# Patient Record
Sex: MALE | Race: WHITE | NOT HISPANIC OR LATINO | Employment: OTHER | URBAN - METROPOLITAN AREA
[De-identification: names, ages, dates, MRNs, and addresses within clinical notes are randomized per-mention and may not be internally consistent; named-entity substitution may affect disease eponyms.]

---

## 2020-01-01 ENCOUNTER — APPOINTMENT (INPATIENT)
Dept: RADIOLOGY | Facility: HOSPITAL | Age: 70
DRG: 137 | End: 2020-01-01
Payer: SUBSIDIZED

## 2020-01-01 ENCOUNTER — HOSPITAL ENCOUNTER (INPATIENT)
Facility: HOSPITAL | Age: 70
LOS: 2 days | DRG: 137 | End: 2020-04-01
Attending: EMERGENCY MEDICINE | Admitting: ANESTHESIOLOGY
Payer: SUBSIDIZED

## 2020-01-01 ENCOUNTER — APPOINTMENT (EMERGENCY)
Dept: RADIOLOGY | Facility: HOSPITAL | Age: 70
DRG: 137 | End: 2020-01-01
Payer: SUBSIDIZED

## 2020-01-01 VITALS
BODY MASS INDEX: 33 KG/M2 | RESPIRATION RATE: 30 BRPM | OXYGEN SATURATION: 91 % | DIASTOLIC BLOOD PRESSURE: 66 MMHG | SYSTOLIC BLOOD PRESSURE: 91 MMHG | HEIGHT: 70 IN | WEIGHT: 230.51 LBS | HEART RATE: 140 BPM | TEMPERATURE: 98.2 F

## 2020-01-01 DIAGNOSIS — R09.02 HYPOXIA: ICD-10-CM

## 2020-01-01 DIAGNOSIS — N17.9 AKI (ACUTE KIDNEY INJURY) (HCC): ICD-10-CM

## 2020-01-01 DIAGNOSIS — J96.00 ACUTE RESPIRATORY FAILURE (HCC): ICD-10-CM

## 2020-01-01 DIAGNOSIS — J18.9 PNEUMONIA: Primary | ICD-10-CM

## 2020-01-01 DIAGNOSIS — N19 RENAL FAILURE: ICD-10-CM

## 2020-01-01 DIAGNOSIS — E87.5 HYPERKALEMIA: ICD-10-CM

## 2020-01-01 LAB
ALBUMIN SERPL BCP-MCNC: 3.2 G/DL (ref 3.5–5)
ALP SERPL-CCNC: 59 U/L (ref 46–116)
ALT SERPL W P-5'-P-CCNC: 38 U/L (ref 12–78)
ANION GAP SERPL CALCULATED.3IONS-SCNC: 11 MMOL/L (ref 4–13)
ANION GAP SERPL CALCULATED.3IONS-SCNC: 12 MMOL/L (ref 4–13)
ANION GAP SERPL CALCULATED.3IONS-SCNC: 12 MMOL/L (ref 4–13)
ANION GAP SERPL CALCULATED.3IONS-SCNC: 20 MMOL/L (ref 4–13)
ANION GAP SERPL CALCULATED.3IONS-SCNC: 9 MMOL/L (ref 4–13)
ANION GAP SERPL CALCULATED.3IONS-SCNC: 9 MMOL/L (ref 4–13)
APTT PPP: 31 SECONDS (ref 23–37)
APTT PPP: 32 SECONDS (ref 23–37)
APTT PPP: 33 SECONDS (ref 23–37)
APTT PPP: 40 SECONDS (ref 23–37)
APTT PPP: 41 SECONDS (ref 23–37)
APTT PPP: 46 SECONDS (ref 23–37)
ARTERIAL PATENCY WRIST A: NO
ARTERIAL PATENCY WRIST A: NO
ARTERIAL PATENCY WRIST A: YES
AST SERPL W P-5'-P-CCNC: 80 U/L (ref 5–45)
ATRIAL RATE: 122 BPM
ATRIAL RATE: 122 BPM
ATRIAL RATE: 127 BPM
ATRIAL RATE: 136 BPM
ATRIAL RATE: 38 BPM
ATRIAL RATE: 95 BPM
BACTERIA UR QL AUTO: ABNORMAL /HPF
BASE EXCESS BLDA CALC-SCNC: -1.8 MMOL/L
BASE EXCESS BLDA CALC-SCNC: -12.2 MMOL/L
BASE EXCESS BLDA CALC-SCNC: -3.3 MMOL/L
BASE EXCESS BLDA CALC-SCNC: -3.8 MMOL/L
BASE EXCESS BLDA CALC-SCNC: -3.9 MMOL/L
BASE EXCESS BLDA CALC-SCNC: -4 MMOL/L
BASE EXCESS BLDA CALC-SCNC: -5.3 MMOL/L
BASE EXCESS BLDA CALC-SCNC: 1.5 MMOL/L
BASOPHILS # BLD AUTO: 0.05 THOUSANDS/ΜL (ref 0–0.1)
BASOPHILS # BLD MANUAL: 0 THOUSAND/UL (ref 0–0.1)
BASOPHILS NFR BLD AUTO: 0 % (ref 0–1)
BASOPHILS NFR MAR MANUAL: 0 % (ref 0–1)
BILIRUB SERPL-MCNC: 0.4 MG/DL (ref 0.2–1)
BILIRUB UR QL STRIP: NEGATIVE
BODY TEMPERATURE: 100.4 DEGREES FEHRENHEIT
BODY TEMPERATURE: 97.5 DEGREES FEHRENHEIT
BODY TEMPERATURE: 98 DEGREES FEHRENHEIT
BODY TEMPERATURE: 98.1 DEGREES FEHRENHEIT
BODY TEMPERATURE: 98.1 DEGREES FEHRENHEIT
BODY TEMPERATURE: 99 DEGREES FEHRENHEIT
BODY TEMPERATURE: 99.1 DEGREES FEHRENHEIT
BODY TEMPERATURE: 99.9 DEGREES FEHRENHEIT
BUN SERPL-MCNC: 20 MG/DL (ref 5–25)
BUN SERPL-MCNC: 25 MG/DL (ref 5–25)
BUN SERPL-MCNC: 31 MG/DL (ref 5–25)
BUN SERPL-MCNC: 39 MG/DL (ref 5–25)
BUN SERPL-MCNC: 42 MG/DL (ref 5–25)
BUN SERPL-MCNC: 54 MG/DL (ref 5–25)
BUN SERPL-MCNC: 65 MG/DL (ref 5–25)
BUN SERPL-MCNC: 68 MG/DL (ref 5–25)
CA-I BLD-SCNC: 1.01 MMOL/L (ref 1.12–1.32)
CA-I BLD-SCNC: 1.08 MMOL/L (ref 1.12–1.32)
CA-I BLD-SCNC: 1.09 MMOL/L (ref 1.12–1.32)
CA-I BLD-SCNC: 1.14 MMOL/L (ref 1.12–1.32)
CA-I BLD-SCNC: 1.15 MMOL/L (ref 1.12–1.32)
CA-I BLD-SCNC: 1.15 MMOL/L (ref 1.12–1.32)
CALCIUM SERPL-MCNC: 7.4 MG/DL (ref 8.3–10.1)
CALCIUM SERPL-MCNC: 7.7 MG/DL (ref 8.3–10.1)
CALCIUM SERPL-MCNC: 7.8 MG/DL (ref 8.3–10.1)
CALCIUM SERPL-MCNC: 8.4 MG/DL (ref 8.3–10.1)
CALCIUM SERPL-MCNC: 8.5 MG/DL (ref 8.3–10.1)
CALCIUM SERPL-MCNC: 8.6 MG/DL (ref 8.3–10.1)
CALCIUM SERPL-MCNC: 8.7 MG/DL (ref 8.3–10.1)
CALCIUM SERPL-MCNC: 8.8 MG/DL (ref 8.3–10.1)
CHLORIDE SERPL-SCNC: 100 MMOL/L (ref 100–108)
CHLORIDE SERPL-SCNC: 101 MMOL/L (ref 100–108)
CHLORIDE SERPL-SCNC: 102 MMOL/L (ref 100–108)
CHLORIDE SERPL-SCNC: 103 MMOL/L (ref 100–108)
CHLORIDE SERPL-SCNC: 103 MMOL/L (ref 100–108)
CHLORIDE SERPL-SCNC: 105 MMOL/L (ref 100–108)
CHLORIDE SERPL-SCNC: 105 MMOL/L (ref 100–108)
CHLORIDE SERPL-SCNC: 99 MMOL/L (ref 100–108)
CLARITY UR: ABNORMAL
CO2 SERPL-SCNC: 16 MMOL/L (ref 21–32)
CO2 SERPL-SCNC: 20 MMOL/L (ref 21–32)
CO2 SERPL-SCNC: 24 MMOL/L (ref 21–32)
CO2 SERPL-SCNC: 24 MMOL/L (ref 21–32)
CO2 SERPL-SCNC: 25 MMOL/L (ref 21–32)
CO2 SERPL-SCNC: 26 MMOL/L (ref 21–32)
CO2 SERPL-SCNC: 26 MMOL/L (ref 21–32)
CO2 SERPL-SCNC: 27 MMOL/L (ref 21–32)
COARSE GRAN CASTS URNS QL MICRO: ABNORMAL /LPF
COLOR UR: YELLOW
CREAT SERPL-MCNC: 1.97 MG/DL (ref 0.6–1.3)
CREAT SERPL-MCNC: 1.97 MG/DL (ref 0.6–1.3)
CREAT SERPL-MCNC: 2.18 MG/DL (ref 0.6–1.3)
CREAT SERPL-MCNC: 2.58 MG/DL (ref 0.6–1.3)
CREAT SERPL-MCNC: 2.71 MG/DL (ref 0.6–1.3)
CREAT SERPL-MCNC: 3.2 MG/DL (ref 0.6–1.3)
CREAT SERPL-MCNC: 4.08 MG/DL (ref 0.6–1.3)
CREAT SERPL-MCNC: 4.81 MG/DL (ref 0.6–1.3)
EOSINOPHIL # BLD AUTO: 0 THOUSAND/ΜL (ref 0–0.61)
EOSINOPHIL # BLD MANUAL: 0 THOUSAND/UL (ref 0–0.4)
EOSINOPHIL NFR BLD AUTO: 0 % (ref 0–6)
EOSINOPHIL NFR BLD MANUAL: 0 % (ref 0–6)
ERYTHROCYTE [DISTWIDTH] IN BLOOD BY AUTOMATED COUNT: 13.8 % (ref 11.6–15.1)
ERYTHROCYTE [DISTWIDTH] IN BLOOD BY AUTOMATED COUNT: 13.9 % (ref 11.6–15.1)
ERYTHROCYTE [DISTWIDTH] IN BLOOD BY AUTOMATED COUNT: 14.1 % (ref 11.6–15.1)
EST. AVERAGE GLUCOSE BLD GHB EST-MCNC: 157 MG/DL
FLUAV RNA NPH QL NAA+PROBE: NORMAL
FLUBV RNA NPH QL NAA+PROBE: NORMAL
GFR SERPL CREATININE-BSD FRML MDRD: 11 ML/MIN/1.73SQ M
GFR SERPL CREATININE-BSD FRML MDRD: 14 ML/MIN/1.73SQ M
GFR SERPL CREATININE-BSD FRML MDRD: 19 ML/MIN/1.73SQ M
GFR SERPL CREATININE-BSD FRML MDRD: 23 ML/MIN/1.73SQ M
GFR SERPL CREATININE-BSD FRML MDRD: 24 ML/MIN/1.73SQ M
GFR SERPL CREATININE-BSD FRML MDRD: 30 ML/MIN/1.73SQ M
GFR SERPL CREATININE-BSD FRML MDRD: 33 ML/MIN/1.73SQ M
GFR SERPL CREATININE-BSD FRML MDRD: 33 ML/MIN/1.73SQ M
GLUCOSE SERPL-MCNC: 119 MG/DL (ref 65–140)
GLUCOSE SERPL-MCNC: 127 MG/DL (ref 65–140)
GLUCOSE SERPL-MCNC: 128 MG/DL (ref 65–140)
GLUCOSE SERPL-MCNC: 129 MG/DL (ref 65–140)
GLUCOSE SERPL-MCNC: 131 MG/DL (ref 65–140)
GLUCOSE SERPL-MCNC: 132 MG/DL (ref 65–140)
GLUCOSE SERPL-MCNC: 133 MG/DL (ref 65–140)
GLUCOSE SERPL-MCNC: 138 MG/DL (ref 65–140)
GLUCOSE SERPL-MCNC: 143 MG/DL (ref 65–140)
GLUCOSE SERPL-MCNC: 148 MG/DL (ref 65–140)
GLUCOSE SERPL-MCNC: 151 MG/DL (ref 65–140)
GLUCOSE SERPL-MCNC: 160 MG/DL (ref 65–140)
GLUCOSE SERPL-MCNC: 172 MG/DL (ref 65–140)
GLUCOSE SERPL-MCNC: 181 MG/DL (ref 65–140)
GLUCOSE SERPL-MCNC: 182 MG/DL (ref 65–140)
GLUCOSE SERPL-MCNC: 182 MG/DL (ref 65–140)
GLUCOSE SERPL-MCNC: 187 MG/DL (ref 65–140)
GLUCOSE SERPL-MCNC: 188 MG/DL (ref 65–140)
GLUCOSE SERPL-MCNC: 198 MG/DL (ref 65–140)
GLUCOSE SERPL-MCNC: 206 MG/DL (ref 65–140)
GLUCOSE SERPL-MCNC: 207 MG/DL (ref 65–140)
GLUCOSE SERPL-MCNC: 257 MG/DL (ref 65–140)
GLUCOSE SERPL-MCNC: 270 MG/DL (ref 65–140)
GLUCOSE SERPL-MCNC: 271 MG/DL (ref 65–140)
GLUCOSE SERPL-MCNC: 283 MG/DL (ref 65–140)
GLUCOSE UR STRIP-MCNC: NEGATIVE MG/DL
HBA1C MFR BLD: 7.1 %
HCO3 BLDA-SCNC: 16 MMOL/L (ref 22–28)
HCO3 BLDA-SCNC: 20.1 MMOL/L (ref 22–28)
HCO3 BLDA-SCNC: 21.6 MMOL/L (ref 22–28)
HCO3 BLDA-SCNC: 22.7 MMOL/L (ref 22–28)
HCO3 BLDA-SCNC: 23.1 MMOL/L (ref 22–28)
HCO3 BLDA-SCNC: 24.2 MMOL/L (ref 22–28)
HCO3 BLDA-SCNC: 24.5 MMOL/L (ref 22–28)
HCO3 BLDA-SCNC: 28.7 MMOL/L (ref 22–28)
HCT VFR BLD AUTO: 38 % (ref 36.5–49.3)
HCT VFR BLD AUTO: 40.3 % (ref 36.5–49.3)
HCT VFR BLD AUTO: 43.7 % (ref 36.5–49.3)
HGB BLD-MCNC: 12.4 G/DL (ref 12–17)
HGB BLD-MCNC: 13 G/DL (ref 12–17)
HGB BLD-MCNC: 14 G/DL (ref 12–17)
HGB UR QL STRIP.AUTO: ABNORMAL
IMM GRANULOCYTES # BLD AUTO: 0.3 THOUSAND/UL (ref 0–0.2)
IMM GRANULOCYTES NFR BLD AUTO: 2 % (ref 0–2)
INR PPP: 1.02 (ref 0.84–1.19)
KETONES UR STRIP-MCNC: NEGATIVE MG/DL
LACTATE SERPL-SCNC: 0.7 MMOL/L (ref 0.5–2)
LACTATE SERPL-SCNC: 4.9 MMOL/L (ref 0.5–2)
LEUKOCYTE ESTERASE UR QL STRIP: NEGATIVE
LG PLATELETS BLD QL SMEAR: PRESENT
LIPASE SERPL-CCNC: 323 U/L (ref 73–393)
LYMPHOCYTES # BLD AUTO: 1.68 THOUSANDS/ΜL (ref 0.6–4.47)
LYMPHOCYTES # BLD AUTO: 1.83 THOUSAND/UL (ref 0.6–4.47)
LYMPHOCYTES # BLD AUTO: 16 % (ref 14–44)
LYMPHOCYTES NFR BLD AUTO: 10 % (ref 14–44)
MAGNESIUM SERPL-MCNC: 1.7 MG/DL (ref 1.6–2.6)
MAGNESIUM SERPL-MCNC: 1.7 MG/DL (ref 1.6–2.6)
MAGNESIUM SERPL-MCNC: 1.9 MG/DL (ref 1.6–2.6)
MAGNESIUM SERPL-MCNC: 2 MG/DL (ref 1.6–2.6)
MAGNESIUM SERPL-MCNC: 2.4 MG/DL (ref 1.6–2.6)
MCH RBC QN AUTO: 28.8 PG (ref 26.8–34.3)
MCH RBC QN AUTO: 29 PG (ref 26.8–34.3)
MCH RBC QN AUTO: 29.1 PG (ref 26.8–34.3)
MCHC RBC AUTO-ENTMCNC: 32 G/DL (ref 31.4–37.4)
MCHC RBC AUTO-ENTMCNC: 32.3 G/DL (ref 31.4–37.4)
MCHC RBC AUTO-ENTMCNC: 32.6 G/DL (ref 31.4–37.4)
MCV RBC AUTO: 89 FL (ref 82–98)
MCV RBC AUTO: 89 FL (ref 82–98)
MCV RBC AUTO: 91 FL (ref 82–98)
MONOCYTES # BLD AUTO: 0.89 THOUSAND/ΜL (ref 0.17–1.22)
MONOCYTES # BLD AUTO: 1.14 THOUSAND/UL (ref 0–1.22)
MONOCYTES NFR BLD AUTO: 5 % (ref 4–12)
MONOCYTES NFR BLD: 10 % (ref 4–12)
MRSA NOSE QL CULT: NORMAL
NEUTROPHILS # BLD AUTO: 13.96 THOUSANDS/ΜL (ref 1.85–7.62)
NEUTROPHILS # BLD MANUAL: 8.46 THOUSAND/UL (ref 1.85–7.62)
NEUTS BAND NFR BLD MANUAL: 7 % (ref 0–8)
NEUTS SEG NFR BLD AUTO: 67 % (ref 43–75)
NEUTS SEG NFR BLD AUTO: 83 % (ref 43–75)
NITRITE UR QL STRIP: NEGATIVE
NON-SQ EPI CELLS URNS QL MICRO: ABNORMAL /HPF
NRBC BLD AUTO-RTO: 0 /100 WBCS
NRBC BLD AUTO-RTO: 0 /100 WBCS
NRBC BLD AUTO-RTO: 1 /100 WBCS
O2 CT BLDA-SCNC: 15.6 ML/DL (ref 16–23)
O2 CT BLDA-SCNC: 15.8 ML/DL (ref 16–23)
O2 CT BLDA-SCNC: 15.9 ML/DL (ref 16–23)
O2 CT BLDA-SCNC: 17.4 ML/DL (ref 16–23)
O2 CT BLDA-SCNC: 17.4 ML/DL (ref 16–23)
O2 CT BLDA-SCNC: 17.7 ML/DL (ref 16–23)
O2 CT BLDA-SCNC: 17.7 ML/DL (ref 16–23)
O2 CT BLDA-SCNC: 17.9 ML/DL (ref 16–23)
OXYHGB MFR BLDA: 86.5 % (ref 94–97)
OXYHGB MFR BLDA: 88.2 % (ref 94–97)
OXYHGB MFR BLDA: 91.2 % (ref 94–97)
OXYHGB MFR BLDA: 95.6 % (ref 94–97)
OXYHGB MFR BLDA: 95.9 % (ref 94–97)
OXYHGB MFR BLDA: 96.5 % (ref 94–97)
OXYHGB MFR BLDA: 96.5 % (ref 94–97)
OXYHGB MFR BLDA: 98.3 % (ref 94–97)
P AXIS: 39 DEGREES
P AXIS: 39 DEGREES
P AXIS: 62 DEGREES
P AXIS: 78 DEGREES
PCO2 BLDA: 33.4 MM HG (ref 36–44)
PCO2 BLDA: 45 MM HG (ref 36–44)
PCO2 BLDA: 45.8 MM HG (ref 36–44)
PCO2 BLDA: 47.7 MM HG (ref 36–44)
PCO2 BLDA: 48.1 MM HG (ref 36–44)
PCO2 BLDA: 49.8 MM HG (ref 36–44)
PCO2 BLDA: 55.7 MM HG (ref 36–44)
PCO2 BLDA: 58.1 MM HG (ref 36–44)
PCO2 TEMP ADJ BLDA: 34.4 MM HG (ref 36–44)
PCO2 TEMP ADJ BLDA: 45.2 MM HG (ref 36–44)
PCO2 TEMP ADJ BLDA: 45.4 MM HG (ref 36–44)
PCO2 TEMP ADJ BLDA: 46.5 MM HG (ref 36–44)
PCO2 TEMP ADJ BLDA: 50.3 MM HG (ref 36–44)
PCO2 TEMP ADJ BLDA: 50.5 MM HG (ref 36–44)
PCO2 TEMP ADJ BLDA: 55 MM HG (ref 36–44)
PCO2 TEMP ADJ BLDA: 57.3 MM HG (ref 36–44)
PH BLD: 7.17 [PH] (ref 7.35–7.45)
PH BLD: 7.26 [PH] (ref 7.35–7.45)
PH BLD: 7.27 [PH] (ref 7.35–7.45)
PH BLD: 7.28 [PH] (ref 7.35–7.45)
PH BLD: 7.3 [PH] (ref 7.35–7.45)
PH BLD: 7.32 [PH] (ref 7.35–7.45)
PH BLD: 7.34 [PH] (ref 7.35–7.45)
PH BLD: 7.39 [PH] (ref 7.35–7.45)
PH BLDA: 7.17 [PH] (ref 7.35–7.45)
PH BLDA: 7.26 [PH] (ref 7.35–7.45)
PH BLDA: 7.27 [PH] (ref 7.35–7.45)
PH BLDA: 7.29 [PH] (ref 7.35–7.45)
PH BLDA: 7.29 [PH] (ref 7.35–7.45)
PH BLDA: 7.31 [PH] (ref 7.35–7.45)
PH BLDA: 7.34 [PH] (ref 7.35–7.45)
PH BLDA: 7.4 [PH] (ref 7.35–7.45)
PH UR STRIP.AUTO: 5 [PH]
PHOSPHATE SERPL-MCNC: 3.3 MG/DL (ref 2.3–4.1)
PHOSPHATE SERPL-MCNC: 3.4 MG/DL (ref 2.3–4.1)
PHOSPHATE SERPL-MCNC: 3.4 MG/DL (ref 2.3–4.1)
PHOSPHATE SERPL-MCNC: 3.5 MG/DL (ref 2.3–4.1)
PHOSPHATE SERPL-MCNC: 3.6 MG/DL (ref 2.3–4.1)
PHOSPHATE SERPL-MCNC: 3.8 MG/DL (ref 2.3–4.1)
PHOSPHATE SERPL-MCNC: 4.5 MG/DL (ref 2.3–4.1)
PLATELET # BLD AUTO: 154 THOUSANDS/UL (ref 149–390)
PLATELET # BLD AUTO: 213 THOUSANDS/UL (ref 149–390)
PLATELET # BLD AUTO: 63 THOUSANDS/UL (ref 149–390)
PLATELET BLD QL SMEAR: ABNORMAL
PLATELET BLD QL SMEAR: ADEQUATE
PMV BLD AUTO: 11.8 FL (ref 8.9–12.7)
PMV BLD AUTO: 12.3 FL (ref 8.9–12.7)
PMV BLD AUTO: 12.7 FL (ref 8.9–12.7)
PO2 BLD: 104.6 MM HG (ref 75–129)
PO2 BLD: 112.5 MM HG (ref 75–129)
PO2 BLD: 114.2 MM HG (ref 75–129)
PO2 BLD: 187.3 MM HG (ref 75–129)
PO2 BLD: 56.5 MM HG (ref 75–129)
PO2 BLD: 57.2 MM HG (ref 75–129)
PO2 BLD: 65 MM HG (ref 75–129)
PO2 BLD: 93.4 MM HG (ref 75–129)
PO2 BLDA: 106.4 MM HG (ref 75–129)
PO2 BLDA: 107.9 MM HG (ref 75–129)
PO2 BLDA: 111.3 MM HG (ref 75–129)
PO2 BLDA: 185.7 MM HG (ref 75–129)
PO2 BLDA: 54.5 MM HG (ref 75–129)
PO2 BLDA: 57.7 MM HG (ref 75–129)
PO2 BLDA: 67.7 MM HG (ref 75–129)
PO2 BLDA: 95.1 MM HG (ref 75–129)
POTASSIUM SERPL-SCNC: 4 MMOL/L (ref 3.5–5.3)
POTASSIUM SERPL-SCNC: 4.1 MMOL/L (ref 3.5–5.3)
POTASSIUM SERPL-SCNC: 4.2 MMOL/L (ref 3.5–5.3)
POTASSIUM SERPL-SCNC: 4.4 MMOL/L (ref 3.5–5.3)
POTASSIUM SERPL-SCNC: 4.8 MMOL/L (ref 3.5–5.3)
POTASSIUM SERPL-SCNC: 4.9 MMOL/L (ref 3.5–5.3)
POTASSIUM SERPL-SCNC: 5.9 MMOL/L (ref 3.5–5.3)
POTASSIUM SERPL-SCNC: 6.6 MMOL/L (ref 3.5–5.3)
PR INTERVAL: 144 MS
PR INTERVAL: 148 MS
PR INTERVAL: 152 MS
PR INTERVAL: 158 MS
PROCALCITONIN SERPL-MCNC: 1.55 NG/ML
PROCALCITONIN SERPL-MCNC: 1.99 NG/ML
PROCALCITONIN SERPL-MCNC: 7.48 NG/ML
PROT SERPL-MCNC: 8.8 G/DL (ref 6.4–8.2)
PROT UR STRIP-MCNC: ABNORMAL MG/DL
PROTHROMBIN TIME: 13.8 SECONDS (ref 11.6–14.5)
QRS AXIS: 1 DEGREES
QRS AXIS: 1 DEGREES
QRS AXIS: 187 DEGREES
QRS AXIS: 74 DEGREES
QRS AXIS: 75 DEGREES
QRS AXIS: 85 DEGREES
QRSD INTERVAL: 102 MS
QRSD INTERVAL: 102 MS
QRSD INTERVAL: 106 MS
QRSD INTERVAL: 110 MS
QRSD INTERVAL: 122 MS
QRSD INTERVAL: 98 MS
QT INTERVAL: 278 MS
QT INTERVAL: 282 MS
QT INTERVAL: 314 MS
QT INTERVAL: 316 MS
QT INTERVAL: 332 MS
QT INTERVAL: 358 MS
QTC INTERVAL: 397 MS
QTC INTERVAL: 404 MS
QTC INTERVAL: 451 MS
QTC INTERVAL: 473 MS
QTC INTERVAL: 494 MS
QTC INTERVAL: 537 MS
RBC # BLD AUTO: 4.26 MILLION/UL (ref 3.88–5.62)
RBC # BLD AUTO: 4.51 MILLION/UL (ref 3.88–5.62)
RBC # BLD AUTO: 4.82 MILLION/UL (ref 3.88–5.62)
RBC #/AREA URNS AUTO: ABNORMAL /HPF
RBC MORPH BLD: NORMAL
RBC MORPH BLD: NORMAL
RSV RNA NPH QL NAA+PROBE: NORMAL
SODIUM SERPL-SCNC: 135 MMOL/L (ref 136–145)
SODIUM SERPL-SCNC: 136 MMOL/L (ref 136–145)
SODIUM SERPL-SCNC: 136 MMOL/L (ref 136–145)
SODIUM SERPL-SCNC: 137 MMOL/L (ref 136–145)
SODIUM SERPL-SCNC: 137 MMOL/L (ref 136–145)
SODIUM SERPL-SCNC: 138 MMOL/L (ref 136–145)
SODIUM SERPL-SCNC: 140 MMOL/L (ref 136–145)
SODIUM SERPL-SCNC: 142 MMOL/L (ref 136–145)
SP GR UR STRIP.AUTO: 1.02 (ref 1–1.03)
SPECIMEN SOURCE: ABNORMAL
T WAVE AXIS: 1 DEGREES
T WAVE AXIS: 15 DEGREES
T WAVE AXIS: 16 DEGREES
T WAVE AXIS: 41 DEGREES
T WAVE AXIS: 56 DEGREES
T WAVE AXIS: 59 DEGREES
TOTAL CELLS COUNTED SPEC: 100
TROPONIN I SERPL-MCNC: <0.02 NG/ML
UROBILINOGEN UR QL STRIP.AUTO: 0.2 E.U./DL
VENTRICULAR RATE: 122 BPM
VENTRICULAR RATE: 124 BPM
VENTRICULAR RATE: 127 BPM
VENTRICULAR RATE: 135 BPM
VENTRICULAR RATE: 185 BPM
VENTRICULAR RATE: 95 BPM
WBC # BLD AUTO: 11.43 THOUSAND/UL (ref 4.31–10.16)
WBC # BLD AUTO: 16.88 THOUSAND/UL (ref 4.31–10.16)
WBC # BLD AUTO: 17.24 THOUSAND/UL (ref 4.31–10.16)
WBC #/AREA URNS AUTO: ABNORMAL /HPF

## 2020-01-01 PROCEDURE — 83735 ASSAY OF MAGNESIUM: CPT | Performed by: PHYSICIAN ASSISTANT

## 2020-01-01 PROCEDURE — 99232 SBSQ HOSP IP/OBS MODERATE 35: CPT | Performed by: INTERNAL MEDICINE

## 2020-01-01 PROCEDURE — 99231 SBSQ HOSP IP/OBS SF/LOW 25: CPT | Performed by: INTERNAL MEDICINE

## 2020-01-01 PROCEDURE — 85007 BL SMEAR W/DIFF WBC COUNT: CPT | Performed by: NURSE PRACTITIONER

## 2020-01-01 PROCEDURE — 87040 BLOOD CULTURE FOR BACTERIA: CPT | Performed by: EMERGENCY MEDICINE

## 2020-01-01 PROCEDURE — 93005 ELECTROCARDIOGRAM TRACING: CPT

## 2020-01-01 PROCEDURE — 5A1D90Z PERFORMANCE OF URINARY FILTRATION, CONTINUOUS, GREATER THAN 18 HOURS PER DAY: ICD-10-PCS | Performed by: INTERNAL MEDICINE

## 2020-01-01 PROCEDURE — 90945 DIALYSIS ONE EVALUATION: CPT

## 2020-01-01 PROCEDURE — 85730 THROMBOPLASTIN TIME PARTIAL: CPT | Performed by: PHYSICIAN ASSISTANT

## 2020-01-01 PROCEDURE — 87631 RESP VIRUS 3-5 TARGETS: CPT | Performed by: EMERGENCY MEDICINE

## 2020-01-01 PROCEDURE — 85730 THROMBOPLASTIN TIME PARTIAL: CPT | Performed by: EMERGENCY MEDICINE

## 2020-01-01 PROCEDURE — 5A1945Z RESPIRATORY VENTILATION, 24-96 CONSECUTIVE HOURS: ICD-10-PCS | Performed by: ANESTHESIOLOGY

## 2020-01-01 PROCEDURE — 80048 BASIC METABOLIC PNL TOTAL CA: CPT | Performed by: INTERNAL MEDICINE

## 2020-01-01 PROCEDURE — 82805 BLOOD GASES W/O2 SATURATION: CPT | Performed by: PHYSICIAN ASSISTANT

## 2020-01-01 PROCEDURE — 83735 ASSAY OF MAGNESIUM: CPT | Performed by: INTERNAL MEDICINE

## 2020-01-01 PROCEDURE — 84100 ASSAY OF PHOSPHORUS: CPT | Performed by: INTERNAL MEDICINE

## 2020-01-01 PROCEDURE — 94760 N-INVAS EAR/PLS OXIMETRY 1: CPT

## 2020-01-01 PROCEDURE — 93010 ELECTROCARDIOGRAM REPORT: CPT | Performed by: INTERNAL MEDICINE

## 2020-01-01 PROCEDURE — 82330 ASSAY OF CALCIUM: CPT | Performed by: INTERNAL MEDICINE

## 2020-01-01 PROCEDURE — 36620 INSERTION CATHETER ARTERY: CPT | Performed by: PHYSICIAN ASSISTANT

## 2020-01-01 PROCEDURE — 84100 ASSAY OF PHOSPHORUS: CPT | Performed by: PHYSICIAN ASSISTANT

## 2020-01-01 PROCEDURE — 99238 HOSP IP/OBS DSCHRG MGMT 30/<: CPT | Performed by: PHYSICIAN ASSISTANT

## 2020-01-01 PROCEDURE — 85610 PROTHROMBIN TIME: CPT | Performed by: EMERGENCY MEDICINE

## 2020-01-01 PROCEDURE — 85027 COMPLETE CBC AUTOMATED: CPT | Performed by: NURSE PRACTITIONER

## 2020-01-01 PROCEDURE — 0BH18EZ INSERTION OF ENDOTRACHEAL AIRWAY INTO TRACHEA, VIA NATURAL OR ARTIFICIAL OPENING ENDOSCOPIC: ICD-10-PCS | Performed by: EMERGENCY MEDICINE

## 2020-01-01 PROCEDURE — 96375 TX/PRO/DX INJ NEW DRUG ADDON: CPT

## 2020-01-01 PROCEDURE — 82947 ASSAY GLUCOSE BLOOD QUANT: CPT | Performed by: ANESTHESIOLOGY

## 2020-01-01 PROCEDURE — 96374 THER/PROPH/DIAG INJ IV PUSH: CPT

## 2020-01-01 PROCEDURE — 84145 PROCALCITONIN (PCT): CPT | Performed by: PHYSICIAN ASSISTANT

## 2020-01-01 PROCEDURE — 94003 VENT MGMT INPAT SUBQ DAY: CPT

## 2020-01-01 PROCEDURE — 71045 X-RAY EXAM CHEST 1 VIEW: CPT

## 2020-01-01 PROCEDURE — 83605 ASSAY OF LACTIC ACID: CPT | Performed by: EMERGENCY MEDICINE

## 2020-01-01 PROCEDURE — 96365 THER/PROPH/DIAG IV INF INIT: CPT

## 2020-01-01 PROCEDURE — 85025 COMPLETE CBC W/AUTO DIFF WBC: CPT | Performed by: PHYSICIAN ASSISTANT

## 2020-01-01 PROCEDURE — NC001 PR NO CHARGE: Performed by: ANESTHESIOLOGY

## 2020-01-01 PROCEDURE — 99292 CRITICAL CARE ADDL 30 MIN: CPT | Performed by: ANESTHESIOLOGY

## 2020-01-01 PROCEDURE — 81001 URINALYSIS AUTO W/SCOPE: CPT | Performed by: EMERGENCY MEDICINE

## 2020-01-01 PROCEDURE — 83605 ASSAY OF LACTIC ACID: CPT | Performed by: ANESTHESIOLOGY

## 2020-01-01 PROCEDURE — 36415 COLL VENOUS BLD VENIPUNCTURE: CPT | Performed by: EMERGENCY MEDICINE

## 2020-01-01 PROCEDURE — 03HC33Z INSERTION OF INFUSION DEVICE INTO LEFT RADIAL ARTERY, PERCUTANEOUS APPROACH: ICD-10-PCS | Performed by: ANESTHESIOLOGY

## 2020-01-01 PROCEDURE — 99292 CRITICAL CARE ADDL 30 MIN: CPT | Performed by: PHYSICIAN ASSISTANT

## 2020-01-01 PROCEDURE — 99291 CRITICAL CARE FIRST HOUR: CPT | Performed by: PHYSICIAN ASSISTANT

## 2020-01-01 PROCEDURE — 80048 BASIC METABOLIC PNL TOTAL CA: CPT | Performed by: PHYSICIAN ASSISTANT

## 2020-01-01 PROCEDURE — 99285 EMERGENCY DEPT VISIT HI MDM: CPT

## 2020-01-01 PROCEDURE — 85027 COMPLETE CBC AUTOMATED: CPT | Performed by: EMERGENCY MEDICINE

## 2020-01-01 PROCEDURE — 36556 INSERT NON-TUNNEL CV CATH: CPT | Performed by: PHYSICIAN ASSISTANT

## 2020-01-01 PROCEDURE — 96366 THER/PROPH/DIAG IV INF ADDON: CPT

## 2020-01-01 PROCEDURE — 02H633Z INSERTION OF INFUSION DEVICE INTO RIGHT ATRIUM, PERCUTANEOUS APPROACH: ICD-10-PCS | Performed by: ANESTHESIOLOGY

## 2020-01-01 PROCEDURE — 83690 ASSAY OF LIPASE: CPT | Performed by: PHYSICIAN ASSISTANT

## 2020-01-01 PROCEDURE — C9113 INJ PANTOPRAZOLE SODIUM, VIA: HCPCS | Performed by: NURSE PRACTITIONER

## 2020-01-01 PROCEDURE — 85007 BL SMEAR W/DIFF WBC COUNT: CPT | Performed by: EMERGENCY MEDICINE

## 2020-01-01 PROCEDURE — 02HV33Z INSERTION OF INFUSION DEVICE INTO SUPERIOR VENA CAVA, PERCUTANEOUS APPROACH: ICD-10-PCS | Performed by: ANESTHESIOLOGY

## 2020-01-01 PROCEDURE — 87635 SARS-COV-2 COVID-19 AMP PRB: CPT | Performed by: EMERGENCY MEDICINE

## 2020-01-01 PROCEDURE — 82948 REAGENT STRIP/BLOOD GLUCOSE: CPT

## 2020-01-01 PROCEDURE — 80053 COMPREHEN METABOLIC PANEL: CPT | Performed by: EMERGENCY MEDICINE

## 2020-01-01 PROCEDURE — 99285 EMERGENCY DEPT VISIT HI MDM: CPT | Performed by: EMERGENCY MEDICINE

## 2020-01-01 PROCEDURE — 84145 PROCALCITONIN (PCT): CPT | Performed by: EMERGENCY MEDICINE

## 2020-01-01 PROCEDURE — 31500 INSERT EMERGENCY AIRWAY: CPT | Performed by: EMERGENCY MEDICINE

## 2020-01-01 PROCEDURE — 94002 VENT MGMT INPAT INIT DAY: CPT

## 2020-01-01 PROCEDURE — 84145 PROCALCITONIN (PCT): CPT | Performed by: NURSE PRACTITIONER

## 2020-01-01 PROCEDURE — 87081 CULTURE SCREEN ONLY: CPT | Performed by: ANESTHESIOLOGY

## 2020-01-01 PROCEDURE — 83036 HEMOGLOBIN GLYCOSYLATED A1C: CPT | Performed by: PHYSICIAN ASSISTANT

## 2020-01-01 PROCEDURE — 84484 ASSAY OF TROPONIN QUANT: CPT | Performed by: EMERGENCY MEDICINE

## 2020-01-01 RX ORDER — CALCIUM GLUCONATE 20 MG/ML
1 INJECTION, SOLUTION INTRAVENOUS ONCE
Status: COMPLETED | OUTPATIENT
Start: 2020-01-01 | End: 2020-01-01

## 2020-01-01 RX ORDER — MAGNESIUM SULFATE 1 G/100ML
1 INJECTION INTRAVENOUS ONCE
Status: COMPLETED | OUTPATIENT
Start: 2020-01-01 | End: 2020-01-01

## 2020-01-01 RX ORDER — HYDROMORPHONE HCL/PF 1 MG/ML
1 SYRINGE (ML) INJECTION ONCE
Status: COMPLETED | OUTPATIENT
Start: 2020-01-01 | End: 2020-01-01

## 2020-01-01 RX ORDER — MIDAZOLAM HYDROCHLORIDE 2 MG/2ML
1 INJECTION, SOLUTION INTRAMUSCULAR; INTRAVENOUS EVERY 2 HOUR PRN
Status: DISCONTINUED | OUTPATIENT
Start: 2020-01-01 | End: 2020-01-01 | Stop reason: HOSPADM

## 2020-01-01 RX ORDER — HYDROMORPHONE HCL 110MG/55ML
2 PATIENT CONTROLLED ANALGESIA SYRINGE INTRAVENOUS ONCE
Status: DISCONTINUED | OUTPATIENT
Start: 2020-01-01 | End: 2020-01-01

## 2020-01-01 RX ORDER — CHLORHEXIDINE GLUCONATE 0.12 MG/ML
15 RINSE ORAL EVERY 12 HOURS SCHEDULED
Status: DISCONTINUED | OUTPATIENT
Start: 2020-01-01 | End: 2020-01-01 | Stop reason: HOSPADM

## 2020-01-01 RX ORDER — HEPARIN SODIUM 5000 [USP'U]/ML
5000 INJECTION, SOLUTION INTRAVENOUS; SUBCUTANEOUS EVERY 8 HOURS SCHEDULED
Status: DISCONTINUED | OUTPATIENT
Start: 2020-01-01 | End: 2020-01-01

## 2020-01-01 RX ORDER — CHLORHEXIDINE GLUCONATE 0.12 MG/ML
15 RINSE ORAL EVERY 12 HOURS SCHEDULED
Status: CANCELLED | OUTPATIENT
Start: 2020-01-01

## 2020-01-01 RX ORDER — CALCIUM GLUCONATE 20 MG/ML
2 INJECTION, SOLUTION INTRAVENOUS ONCE
Status: COMPLETED | OUTPATIENT
Start: 2020-01-01 | End: 2020-01-01

## 2020-01-01 RX ORDER — MIDAZOLAM HYDROCHLORIDE 2 MG/2ML
4 INJECTION, SOLUTION INTRAMUSCULAR; INTRAVENOUS ONCE
Status: DISCONTINUED | OUTPATIENT
Start: 2020-01-01 | End: 2020-01-01 | Stop reason: HOSPADM

## 2020-01-01 RX ORDER — DEXTROSE MONOHYDRATE 25 G/50ML
13 INJECTION, SOLUTION INTRAVENOUS ONCE
Status: COMPLETED | OUTPATIENT
Start: 2020-01-01 | End: 2020-01-01

## 2020-01-01 RX ORDER — EPINEPHRINE 1 MG/ML
1 INJECTION, SOLUTION, CONCENTRATE INTRAVENOUS ONCE
Status: COMPLETED | OUTPATIENT
Start: 2020-01-01 | End: 2020-01-01

## 2020-01-01 RX ORDER — VECURONIUM BROMIDE 1 MG/ML
10 INJECTION, POWDER, LYOPHILIZED, FOR SOLUTION INTRAVENOUS ONCE
Status: DISCONTINUED | OUTPATIENT
Start: 2020-01-01 | End: 2020-01-01

## 2020-01-01 RX ORDER — CHLORHEXIDINE GLUCONATE 0.12 MG/ML
15 RINSE ORAL EVERY 12 HOURS SCHEDULED
Status: DISCONTINUED | OUTPATIENT
Start: 2020-01-01 | End: 2020-01-01 | Stop reason: SDUPTHER

## 2020-01-01 RX ORDER — ALBUMIN, HUMAN INJ 5% 5 %
25 SOLUTION INTRAVENOUS ONCE
Status: COMPLETED | OUTPATIENT
Start: 2020-01-01 | End: 2020-01-01

## 2020-01-01 RX ORDER — VECURONIUM BROMIDE 1 MG/ML
10 INJECTION, POWDER, LYOPHILIZED, FOR SOLUTION INTRAVENOUS ONCE
Status: COMPLETED | OUTPATIENT
Start: 2020-01-01 | End: 2020-01-01

## 2020-01-01 RX ORDER — ACETAMINOPHEN 160 MG/5ML
650 SUSPENSION, ORAL (FINAL DOSE FORM) ORAL EVERY 6 HOURS PRN
Status: DISCONTINUED | OUTPATIENT
Start: 2020-01-01 | End: 2020-01-01 | Stop reason: HOSPADM

## 2020-01-01 RX ORDER — HYDROXYCHLOROQUINE SULFATE 200 MG/1
200 TABLET, FILM COATED ORAL EVERY 8 HOURS
Status: DISCONTINUED | OUTPATIENT
Start: 2020-01-01 | End: 2020-01-01 | Stop reason: CLARIF

## 2020-01-01 RX ORDER — MINERAL OIL AND PETROLATUM 150; 830 MG/G; MG/G
OINTMENT OPHTHALMIC
Status: DISCONTINUED | OUTPATIENT
Start: 2020-01-01 | End: 2020-01-01 | Stop reason: HOSPADM

## 2020-01-01 RX ORDER — HYDROXYCHLOROQUINE SULFATE 200 MG/1
600 TABLET, FILM COATED ORAL 2 TIMES DAILY
Status: DISCONTINUED | OUTPATIENT
Start: 2020-01-01 | End: 2020-01-01 | Stop reason: CLARIF

## 2020-01-01 RX ORDER — NOREPINEPHRINE BITARTRATE 1 MG/ML
INJECTION, SOLUTION INTRAVENOUS
Status: COMPLETED
Start: 2020-01-01 | End: 2020-01-01

## 2020-01-01 RX ORDER — CEFTRIAXONE 1 G/50ML
1000 INJECTION, SOLUTION INTRAVENOUS EVERY 24 HOURS
Status: DISCONTINUED | OUTPATIENT
Start: 2020-01-01 | End: 2020-01-01 | Stop reason: HOSPADM

## 2020-01-01 RX ORDER — PROPOFOL 10 MG/ML
5-50 INJECTION, EMULSION INTRAVENOUS
Status: DISCONTINUED | OUTPATIENT
Start: 2020-01-01 | End: 2020-01-01 | Stop reason: HOSPADM

## 2020-01-01 RX ORDER — SODIUM POLYSTYRENE SULFONATE 4.1 MEQ/G
15 POWDER, FOR SUSPENSION ORAL; RECTAL ONCE
Status: COMPLETED | OUTPATIENT
Start: 2020-01-01 | End: 2020-01-01

## 2020-01-01 RX ORDER — MAGNESIUM SULFATE HEPTAHYDRATE 40 MG/ML
2 INJECTION, SOLUTION INTRAVENOUS ONCE
Status: COMPLETED | OUTPATIENT
Start: 2020-01-01 | End: 2020-01-01

## 2020-01-01 RX ORDER — DEXTROSE MONOHYDRATE 25 G/50ML
25 INJECTION, SOLUTION INTRAVENOUS ONCE
Status: COMPLETED | OUTPATIENT
Start: 2020-01-01 | End: 2020-01-01

## 2020-01-01 RX ORDER — FUROSEMIDE 10 MG/ML
20 INJECTION INTRAMUSCULAR; INTRAVENOUS ONCE
Status: DISCONTINUED | OUTPATIENT
Start: 2020-01-01 | End: 2020-01-01

## 2020-01-01 RX ORDER — FENTANYL CITRATE 50 UG/ML
50 INJECTION, SOLUTION INTRAMUSCULAR; INTRAVENOUS
Status: DISCONTINUED | OUTPATIENT
Start: 2020-01-01 | End: 2020-01-01 | Stop reason: HOSPADM

## 2020-01-01 RX ORDER — MIDAZOLAM HYDROCHLORIDE 2 MG/2ML
2 INJECTION, SOLUTION INTRAMUSCULAR; INTRAVENOUS ONCE
Status: COMPLETED | OUTPATIENT
Start: 2020-01-01 | End: 2020-01-01

## 2020-01-01 RX ORDER — MIDAZOLAM HYDROCHLORIDE 2 MG/2ML
1 INJECTION, SOLUTION INTRAMUSCULAR; INTRAVENOUS ONCE
Status: COMPLETED | OUTPATIENT
Start: 2020-01-01 | End: 2020-01-01

## 2020-01-01 RX ORDER — MIDAZOLAM HYDROCHLORIDE 2 MG/2ML
INJECTION, SOLUTION INTRAMUSCULAR; INTRAVENOUS
Status: DISCONTINUED
Start: 2020-01-01 | End: 2020-01-01 | Stop reason: WASHOUT

## 2020-01-01 RX ORDER — ETOMIDATE 2 MG/ML
20 INJECTION INTRAVENOUS ONCE
Status: COMPLETED | OUTPATIENT
Start: 2020-01-01 | End: 2020-01-01

## 2020-01-01 RX ORDER — HEPARIN SODIUM 10000 [USP'U]/100ML
600 INJECTION, SOLUTION INTRAVENOUS CONTINUOUS
Status: DISCONTINUED | OUTPATIENT
Start: 2020-01-01 | End: 2020-01-01 | Stop reason: HOSPADM

## 2020-01-01 RX ORDER — ADENOSINE 3 MG/ML
6 INJECTION INTRAVENOUS ONCE
Status: DISCONTINUED | OUTPATIENT
Start: 2020-01-01 | End: 2020-01-01

## 2020-01-01 RX ORDER — SODIUM CHLORIDE 9 MG/ML
1000 INJECTION, SOLUTION INTRAVENOUS ONCE
Status: COMPLETED | OUTPATIENT
Start: 2020-01-01 | End: 2020-01-01

## 2020-01-01 RX ORDER — CEFTRIAXONE 1 G/50ML
1000 INJECTION, SOLUTION INTRAVENOUS ONCE
Status: COMPLETED | OUTPATIENT
Start: 2020-01-01 | End: 2020-01-01

## 2020-01-01 RX ORDER — MINERAL OIL AND PETROLATUM 150; 830 MG/G; MG/G
OINTMENT OPHTHALMIC
Status: DISCONTINUED | OUTPATIENT
Start: 2020-01-01 | End: 2020-01-01

## 2020-01-01 RX ORDER — AZITHROMYCIN 500 MG/1
500 TABLET, FILM COATED ORAL EVERY 24 HOURS
Status: DISCONTINUED | OUTPATIENT
Start: 2020-01-01 | End: 2020-01-01 | Stop reason: HOSPADM

## 2020-01-01 RX ORDER — PANTOPRAZOLE SODIUM 40 MG/1
40 INJECTION, POWDER, FOR SOLUTION INTRAVENOUS
Status: DISCONTINUED | OUTPATIENT
Start: 2020-01-01 | End: 2020-01-01 | Stop reason: HOSPADM

## 2020-01-01 RX ORDER — METOPROLOL TARTRATE 5 MG/5ML
5 INJECTION INTRAVENOUS ONCE
Status: COMPLETED | OUTPATIENT
Start: 2020-01-01 | End: 2020-01-01

## 2020-01-01 RX ORDER — SUCCINYLCHOLINE/SOD CL,ISO/PF 100 MG/5ML
1 SYRINGE (ML) INTRAVENOUS ONCE
Status: COMPLETED | OUTPATIENT
Start: 2020-01-01 | End: 2020-01-01

## 2020-01-01 RX ADMIN — CALCIUM GLUCONATE 2 G: 20 INJECTION, SOLUTION INTRAVENOUS at 22:22

## 2020-01-01 RX ADMIN — SODIUM BICARBONATE 80 ML/HR: 84 INJECTION, SOLUTION INTRAVENOUS at 04:12

## 2020-01-01 RX ADMIN — SODIUM BICARBONATE 100 MEQ: 84 INJECTION, SOLUTION INTRAVENOUS at 19:01

## 2020-01-01 RX ADMIN — Medication: at 18:40

## 2020-01-01 RX ADMIN — PROPOFOL 25 MCG/KG/MIN: 10 INJECTION, EMULSION INTRAVENOUS at 06:07

## 2020-01-01 RX ADMIN — Medication: at 14:49

## 2020-01-01 RX ADMIN — VECURONIUM BROMIDE 1 MCG/KG/MIN: 1 INJECTION, POWDER, LYOPHILIZED, FOR SOLUTION INTRAVENOUS at 15:31

## 2020-01-01 RX ADMIN — SODIUM BICARBONATE 50 MEQ: 84 INJECTION, SOLUTION INTRAVENOUS at 18:31

## 2020-01-01 RX ADMIN — MAGNESIUM SULFATE HEPTAHYDRATE 1 G: 1 INJECTION, SOLUTION INTRAVENOUS at 01:02

## 2020-01-01 RX ADMIN — SODIUM BICARBONATE 80 ML/HR: 84 INJECTION, SOLUTION INTRAVENOUS at 15:53

## 2020-01-01 RX ADMIN — Medication: at 16:00

## 2020-01-01 RX ADMIN — Medication 1 APPLICATION: at 06:06

## 2020-01-01 RX ADMIN — PROPOFOL 50 MCG/KG/MIN: 10 INJECTION, EMULSION INTRAVENOUS at 12:46

## 2020-01-01 RX ADMIN — PROPOFOL 20 MCG/KG/MIN: 10 INJECTION, EMULSION INTRAVENOUS at 08:22

## 2020-01-01 RX ADMIN — Medication 1 APPLICATION: at 02:00

## 2020-01-01 RX ADMIN — SODIUM CHLORIDE 20 MCG/MIN: 0.9 INJECTION, SOLUTION INTRAVENOUS at 14:13

## 2020-01-01 RX ADMIN — Medication 600 MG: at 16:03

## 2020-01-01 RX ADMIN — AZITHROMYCIN 500 MG: 500 TABLET, FILM COATED ORAL at 12:40

## 2020-01-01 RX ADMIN — Medication 100 MG: at 13:02

## 2020-01-01 RX ADMIN — Medication: at 17:43

## 2020-01-01 RX ADMIN — SODIUM CHLORIDE 1000 ML/HR: 0.9 INJECTION, SOLUTION INTRAVENOUS at 11:43

## 2020-01-01 RX ADMIN — NOREPINEPHRINE BITARTRATE 10 MCG: 1 INJECTION INTRAVENOUS at 19:42

## 2020-01-01 RX ADMIN — CHLORHEXIDINE GLUCONATE 0.12% ORAL RINSE 15 ML: 1.2 LIQUID ORAL at 08:21

## 2020-01-01 RX ADMIN — PROPOFOL 50 MCG/KG/MIN: 10 INJECTION, EMULSION INTRAVENOUS at 22:42

## 2020-01-01 RX ADMIN — CALCIUM GLUCONATE 1 G: 20 INJECTION, SOLUTION INTRAVENOUS at 06:43

## 2020-01-01 RX ADMIN — SODIUM POLYSTYRENE SULFONATE 15 G: 1 POWDER ORAL; RECTAL at 12:01

## 2020-01-01 RX ADMIN — SODIUM BICARBONATE 50 MEQ: 84 INJECTION, SOLUTION INTRAVENOUS at 08:50

## 2020-01-01 RX ADMIN — PANTOPRAZOLE SODIUM 40 MG: 40 INJECTION, POWDER, FOR SOLUTION INTRAVENOUS at 08:35

## 2020-01-01 RX ADMIN — VECURONIUM BROMIDE 1 MCG/KG/MIN: 1 INJECTION, POWDER, LYOPHILIZED, FOR SOLUTION INTRAVENOUS at 21:48

## 2020-01-01 RX ADMIN — INSULIN HUMAN 10 UNITS: 100 INJECTION, SOLUTION PARENTERAL at 18:36

## 2020-01-01 RX ADMIN — Medication: at 02:10

## 2020-01-01 RX ADMIN — SODIUM CHLORIDE 4 UNITS/HR: 9 INJECTION, SOLUTION INTRAVENOUS at 15:41

## 2020-01-01 RX ADMIN — INSULIN HUMAN 10 UNITS: 100 INJECTION, SOLUTION PARENTERAL at 12:01

## 2020-01-01 RX ADMIN — CHLORHEXIDINE GLUCONATE 0.12% ORAL RINSE 15 ML: 1.2 LIQUID ORAL at 08:24

## 2020-01-01 RX ADMIN — Medication 1 G: at 08:55

## 2020-01-01 RX ADMIN — Medication: at 22:22

## 2020-01-01 RX ADMIN — EPINEPHRINE 1 MG: 1 INJECTION, SOLUTION INTRAMUSCULAR; SUBCUTANEOUS at 08:55

## 2020-01-01 RX ADMIN — MAGNESIUM SULFATE HEPTAHYDRATE 1 G: 1 INJECTION, SOLUTION INTRAVENOUS at 04:40

## 2020-01-01 RX ADMIN — VECURONIUM BROMIDE 1 MCG/KG/MIN: 1 INJECTION, POWDER, LYOPHILIZED, FOR SOLUTION INTRAVENOUS at 02:49

## 2020-01-01 RX ADMIN — Medication: at 04:00

## 2020-01-01 RX ADMIN — VECURONIUM BROMIDE 1 MCG/KG/MIN: 1 INJECTION, POWDER, LYOPHILIZED, FOR SOLUTION INTRAVENOUS at 05:09

## 2020-01-01 RX ADMIN — CALCIUM GLUCONATE 1 G: 20 INJECTION, SOLUTION INTRAVENOUS at 00:48

## 2020-01-01 RX ADMIN — HYDROMORPHONE HYDROCHLORIDE 1 MG: 1 INJECTION, SOLUTION INTRAMUSCULAR; INTRAVENOUS; SUBCUTANEOUS at 08:35

## 2020-01-01 RX ADMIN — MAGNESIUM SULFATE HEPTAHYDRATE 1 G: 1 INJECTION, SOLUTION INTRAVENOUS at 07:42

## 2020-01-01 RX ADMIN — Medication 1 APPLICATION: at 20:56

## 2020-01-01 RX ADMIN — SODIUM CHLORIDE 15 MCG/MIN: 0.9 INJECTION, SOLUTION INTRAVENOUS at 22:41

## 2020-01-01 RX ADMIN — VECURONIUM BROMIDE 1 MCG/KG/MIN: 1 INJECTION, POWDER, LYOPHILIZED, FOR SOLUTION INTRAVENOUS at 17:31

## 2020-01-01 RX ADMIN — CALCIUM GLUCONATE 2 G: 20 INJECTION, SOLUTION INTRAVENOUS at 12:41

## 2020-01-01 RX ADMIN — SODIUM CHLORIDE 10 MCG/MIN: 0.9 INJECTION, SOLUTION INTRAVENOUS at 19:10

## 2020-01-01 RX ADMIN — SODIUM POLYSTYRENE SULFONATE 15 G: 1 POWDER ORAL; RECTAL at 16:00

## 2020-01-01 RX ADMIN — Medication 20000 ML: at 04:00

## 2020-01-01 RX ADMIN — Medication 200 MG: at 17:57

## 2020-01-01 RX ADMIN — PROPOFOL 20 MCG/KG/MIN: 10 INJECTION, EMULSION INTRAVENOUS at 22:22

## 2020-01-01 RX ADMIN — CALCIUM GLUCONATE 1 G: 20 INJECTION, SOLUTION INTRAVENOUS at 12:02

## 2020-01-01 RX ADMIN — MAGNESIUM SULFATE HEPTAHYDRATE 2 G: 40 INJECTION, SOLUTION INTRAVENOUS at 18:37

## 2020-01-01 RX ADMIN — Medication 20000 ML: at 00:17

## 2020-01-01 RX ADMIN — SUGAMMADEX 200 MG: 100 INJECTION, SOLUTION INTRAVENOUS at 09:25

## 2020-01-01 RX ADMIN — VECURONIUM BROMIDE 10 MG: 1 INJECTION, POWDER, LYOPHILIZED, FOR SOLUTION INTRAVENOUS at 15:26

## 2020-01-01 RX ADMIN — VECURONIUM BROMIDE 1 MCG/KG/MIN: 1 INJECTION, POWDER, LYOPHILIZED, FOR SOLUTION INTRAVENOUS at 06:42

## 2020-01-01 RX ADMIN — PROPOFOL 20 MCG/KG/MIN: 10 INJECTION, EMULSION INTRAVENOUS at 08:36

## 2020-01-01 RX ADMIN — CHLORHEXIDINE GLUCONATE 0.12% ORAL RINSE 15 ML: 1.2 LIQUID ORAL at 21:45

## 2020-01-01 RX ADMIN — Medication: at 00:30

## 2020-01-01 RX ADMIN — Medication 20000 ML: at 06:43

## 2020-01-01 RX ADMIN — Medication 20000 ML: at 17:31

## 2020-01-01 RX ADMIN — CALCIUM GLUCONATE 1 G: 20 INJECTION, SOLUTION INTRAVENOUS at 18:38

## 2020-01-01 RX ADMIN — VECURONIUM BROMIDE 1 MCG/KG/MIN: 1 INJECTION, POWDER, LYOPHILIZED, FOR SOLUTION INTRAVENOUS at 04:12

## 2020-01-01 RX ADMIN — Medication: at 08:21

## 2020-01-01 RX ADMIN — ALBUMIN (HUMAN) 25 G: 12.5 INJECTION, SOLUTION INTRAVENOUS at 08:57

## 2020-01-01 RX ADMIN — Medication 600 MG: at 08:21

## 2020-01-01 RX ADMIN — PROPOFOL 50 MCG/KG/MIN: 10 INJECTION, EMULSION INTRAVENOUS at 17:26

## 2020-01-01 RX ADMIN — MIDAZOLAM 2 MG: 1 INJECTION INTRAMUSCULAR; INTRAVENOUS at 14:22

## 2020-01-01 RX ADMIN — CALCIUM GLUCONATE 1 G: 20 INJECTION, SOLUTION INTRAVENOUS at 19:35

## 2020-01-01 RX ADMIN — HYDROMORPHONE HYDROCHLORIDE 1 MG: 1 INJECTION, SOLUTION INTRAMUSCULAR; INTRAVENOUS; SUBCUTANEOUS at 18:43

## 2020-01-01 RX ADMIN — Medication 1 APPLICATION: at 06:10

## 2020-01-01 RX ADMIN — Medication 20000 ML: at 20:04

## 2020-01-01 RX ADMIN — METOPROLOL TARTRATE 5 MG: 5 INJECTION INTRAVENOUS at 22:47

## 2020-01-01 RX ADMIN — Medication: at 19:51

## 2020-01-01 RX ADMIN — AZITHROMYCIN 500 MG: 500 INJECTION, POWDER, LYOPHILIZED, FOR SOLUTION INTRAVENOUS at 12:15

## 2020-01-01 RX ADMIN — SODIUM CHLORIDE 15 MCG/MIN: 0.9 INJECTION, SOLUTION INTRAVENOUS at 06:07

## 2020-01-01 RX ADMIN — VECURONIUM BROMIDE 1 MCG/KG/MIN: 1 INJECTION, POWDER, LYOPHILIZED, FOR SOLUTION INTRAVENOUS at 01:05

## 2020-01-01 RX ADMIN — DEXTROSE 50 % IN WATER (D50W) INTRAVENOUS SYRINGE 25 ML: at 18:31

## 2020-01-01 RX ADMIN — VECURONIUM BROMIDE 1 MCG/KG/MIN: 1 INJECTION, POWDER, LYOPHILIZED, FOR SOLUTION INTRAVENOUS at 20:37

## 2020-01-01 RX ADMIN — SODIUM CHLORIDE 2 UNITS/HR: 9 INJECTION, SOLUTION INTRAVENOUS at 08:24

## 2020-01-01 RX ADMIN — MIDAZOLAM 1 MG: 1 INJECTION INTRAMUSCULAR; INTRAVENOUS at 12:40

## 2020-01-01 RX ADMIN — SODIUM BICARBONATE 50 MEQ: 84 INJECTION, SOLUTION INTRAVENOUS at 08:56

## 2020-01-01 RX ADMIN — Medication: at 22:00

## 2020-01-01 RX ADMIN — PROPOFOL 30 MCG/KG/MIN: 10 INJECTION, EMULSION INTRAVENOUS at 02:53

## 2020-01-01 RX ADMIN — PROPOFOL 7.94 MCG/KG/MIN: 10 INJECTION, EMULSION INTRAVENOUS at 13:03

## 2020-01-01 RX ADMIN — MAGNESIUM SULFATE HEPTAHYDRATE 1 G: 1 INJECTION, SOLUTION INTRAVENOUS at 19:35

## 2020-01-01 RX ADMIN — WATER: 1 INJECTION INTRAMUSCULAR; INTRAVENOUS; SUBCUTANEOUS at 21:29

## 2020-01-01 RX ADMIN — Medication 1 APPLICATION: at 00:03

## 2020-01-01 RX ADMIN — VECURONIUM BROMIDE 1 MCG/KG/MIN: 1 INJECTION, POWDER, LYOPHILIZED, FOR SOLUTION INTRAVENOUS at 17:48

## 2020-01-01 RX ADMIN — MIDAZOLAM 2 MG: 1 INJECTION INTRAMUSCULAR; INTRAVENOUS at 18:42

## 2020-01-01 RX ADMIN — PROPOFOL 50 MCG/KG/MIN: 10 INJECTION, EMULSION INTRAVENOUS at 14:13

## 2020-01-01 RX ADMIN — HEPARIN SODIUM AND DEXTROSE 600 UNITS/HR: 10000; 5 INJECTION INTRAVENOUS at 04:22

## 2020-01-01 RX ADMIN — HEPARIN SODIUM AND DEXTROSE 600 UNITS/HR: 10000; 5 INJECTION INTRAVENOUS at 19:45

## 2020-01-01 RX ADMIN — PROPOFOL 50 MCG/KG/MIN: 10 INJECTION, EMULSION INTRAVENOUS at 17:31

## 2020-01-01 RX ADMIN — Medication 100 MEQ: at 19:01

## 2020-01-01 RX ADMIN — ETOMIDATE 20 MG: 20 INJECTION, SOLUTION INTRAVENOUS at 13:24

## 2020-01-01 RX ADMIN — CHLORHEXIDINE GLUCONATE 0.12% ORAL RINSE 15 ML: 1.2 LIQUID ORAL at 20:31

## 2020-01-01 RX ADMIN — Medication 200 MG: at 07:38

## 2020-01-01 RX ADMIN — Medication 20000 ML: at 20:45

## 2020-01-01 RX ADMIN — DEXTROSE 50 % IN WATER (D50W) INTRAVENOUS SYRINGE 13 ML: at 12:01

## 2020-01-01 RX ADMIN — HEPARIN SODIUM 5000 UNITS: 5000 INJECTION, SOLUTION INTRAVENOUS; SUBCUTANEOUS at 16:04

## 2020-01-01 RX ADMIN — CALCIUM GLUCONATE 2 G: 20 INJECTION, SOLUTION INTRAVENOUS at 04:40

## 2020-01-01 RX ADMIN — Medication 1 APPLICATION: at 04:12

## 2020-01-01 RX ADMIN — CEFTRIAXONE 1000 MG: 1 INJECTION, SOLUTION INTRAVENOUS at 12:05

## 2020-01-01 RX ADMIN — CEFTRIAXONE 1000 MG: 1 INJECTION, SOLUTION INTRAVENOUS at 12:41

## 2020-01-01 RX ADMIN — PROPOFOL 50 MCG/KG/MIN: 10 INJECTION, EMULSION INTRAVENOUS at 19:54

## 2020-01-01 RX ADMIN — SODIUM CHLORIDE 8 UNITS/HR: 9 INJECTION, SOLUTION INTRAVENOUS at 00:48

## 2020-01-01 RX ADMIN — Medication: at 08:24

## 2020-03-30 PROBLEM — R73.9 HYPERGLYCEMIA: Status: ACTIVE | Noted: 2020-01-01

## 2020-03-30 PROBLEM — J80 ARDS (ADULT RESPIRATORY DISTRESS SYNDROME) (HCC): Status: ACTIVE | Noted: 2020-01-01

## 2020-03-30 PROBLEM — E87.2 LACTIC ACIDOSIS: Status: ACTIVE | Noted: 2020-01-01

## 2020-03-30 PROBLEM — N39.0 UTI (URINARY TRACT INFECTION): Status: ACTIVE | Noted: 2020-01-01

## 2020-03-30 PROBLEM — J96.00 ACUTE RESPIRATORY FAILURE (HCC): Status: ACTIVE | Noted: 2020-01-01

## 2020-03-30 PROBLEM — E11.9 DM2 (DIABETES MELLITUS, TYPE 2) (HCC): Status: ACTIVE | Noted: 2020-01-01

## 2020-03-30 PROBLEM — E87.5 HYPERKALEMIA: Status: ACTIVE | Noted: 2020-01-01

## 2020-03-30 PROBLEM — Z20.822 SUSPECTED COVID-19 VIRUS INFECTION: Status: ACTIVE | Noted: 2020-01-01

## 2020-03-30 PROBLEM — N17.9 AKI (ACUTE KIDNEY INJURY) (HCC): Status: ACTIVE | Noted: 2020-01-01

## 2020-03-30 NOTE — ASSESSMENT & PLAN NOTE
No results found for: HGBA1C    Recent Labs     03/30/20  1536   POCGLU 257*       Blood Sugar Average: Last 72 hrs:  (P) 257     Hgb A1c ordered  Start insulin gtt

## 2020-03-30 NOTE — PLAN OF CARE
Problem: Potential for Falls  Goal: Patient will remain free of falls  Description  INTERVENTIONS:  - Assess patient frequently for physical needs  -  Identify cognitive and physical deficits and behaviors that affect risk of falls    -  [place] fall precautions as indicated by assessment   - Educate patient/family on patient safety including physical limitations  - Instruct patient to call for assistance with activity based on assessment  - Modify environment to reduce risk of injury  - Consider OT/PT consult to assist with strengthening/mobility  Outcome: Progressing     Problem: RESPIRATORY - ADULT  Goal: Achieves optimal ventilation and oxygenation  Description  INTERVENTIONS:  - Assess for changes in respiratory status  - Assess for changes in mentation and behavior  - Position to facilitate oxygenation and minimize respiratory effort  - Oxygen administered by appropriate delivery if ordered  - Initiate smoking cessation education as indicated  - Encourage broncho-pulmonary hygiene including cough, deep breathe, Incentive Spirometry  - Assess the need for suctioning and aspirate as needed  - Assess and instruct to report SOB or any respiratory difficulty  - Respiratory Therapy support as indicated  Outcome: Progressing     Problem: GASTROINTESTINAL - ADULT  Goal: Minimal or absence of nausea and/or vomiting  Description  INTERVENTIONS:  - Administer IV fluids if ordered to ensure adequate hydration  - Maintain NPO status until nausea and vomiting are resolved  - Nasogastric tube if ordered  - Administer ordered antiemetic medications as needed  - Provide nonpharmacologic comfort measures as appropriate  - Advance diet as tolerated, if ordered  - Consider nutrition services referral to assist patient with adequate nutrition and appropriate food choices  Outcome: Progressing  Goal: Maintains or returns to baseline bowel function  Description  INTERVENTIONS:  - Assess bowel function  - Encourage oral fluids to ensure adequate hydration  - Administer IV fluids if ordered to ensure adequate hydration  - Administer ordered medications as needed  - Encourage mobilization and activity  - Consider nutritional services referral to assist patient with adequate nutrition and appropriate food choices  Outcome: Progressing  Goal: Maintains adequate nutritional intake  Description  INTERVENTIONS:  - Monitor percentage of each meal consumed  - Identify factors contributing to decreased intake, treat as appropriate  - Assist with meals as needed  - Monitor I&O, weight, and lab values if indicated  - Obtain nutrition services referral as needed  Outcome: Progressing  Goal: Establish and maintain optimal ostomy function  Description  INTERVENTIONS:  - Assess bowel function  - Encourage oral fluids to ensure adequate hydration  - Administer IV fluids if ordered to ensure adequate hydration   - Administer ordered medications as needed  - Encourage mobilization and activity  - Nutrition services referral to assist patient with appropriate food choices  - Assess stoma site  - Consider wound care consult   Outcome: Progressing     Problem: METABOLIC, FLUID AND ELECTROLYTES - ADULT  Goal: Electrolytes maintained within normal limits  Description  INTERVENTIONS:  - Monitor labs and assess patient for signs and symptoms of electrolyte imbalances  - Administer electrolyte replacement as ordered  - Monitor response to electrolyte replacements, including repeat lab results as appropriate  - Instruct patient on fluid and nutrition as appropriate  Outcome: Progressing  Goal: Fluid balance maintained  Description  INTERVENTIONS:  - Monitor labs   - Monitor I/O and WT  - Instruct patient on fluid and nutrition as appropriate  - Assess for signs & symptoms of volume excess or deficit  Outcome: Progressing  Goal: Glucose maintained within target range  Description  INTERVENTIONS:  - Monitor Blood Glucose as ordered  - Assess for signs and symptoms of hyperglycemia and hypoglycemia  - Administer ordered medications to maintain glucose within target range  - Assess nutritional intake and initiate nutrition service referral as needed  Outcome: Progressing   Care planning initiated

## 2020-03-30 NOTE — QUICK NOTE
Renal on call note:    Repeat labs reviewed which shows worsening severe hyperkalemia 6 6  UO remains very poor   Also has persistent metabolic and respiratory acidosis  Universal consent is obtained which includes dialysis consent as per my discussion with Dr Rahul Bo  Will initiate CVVH with 0K bath for now  Once K level is <5 5, will switch to higher K bath  Also recommended to start pre filter heparin 600 units/hour with PTT q6 hourly  Discussed with Ollie Amaya from ICU

## 2020-03-30 NOTE — ED NOTES
Patient was waking up post ET intubation,100mg bolus of propofol given,with no success  verconium 10mgs given with good effect  Patient BP decreased and IVF's hung via gravity to run wide open  Propofol drip infusing  Patient on ventilator with oxygen saturations at 90-91%   Xray verified ET tube and OG tube placement  Morrison catheter,16FR,inserted via sterile technique,with cloudy yellow return noted,bag secured to left thigh  Awaiting ICU team to see patient  Side rails are up  Vital signs within normal limits at this time       Gilda Mcclure RN  03/30/20 0932

## 2020-03-30 NOTE — PROCEDURES
Central Line Insertion  Date/Time: 3/30/2020 5:25 PM  Performed by: Jaime Bermudez PA-C  Authorized by: Jaime Bermudez PA-C     Patient location:  Bedside  Consent:     Consent obtained:  Verbal (universal consent via phone)    Consent given by:  Healthcare agent (granddaughter)    Risks discussed:  Arterial puncture, incorrect placement, nerve damage, pneumothorax, infection and bleeding    Alternatives discussed:  No treatment  Universal protocol:     Procedure explained and questions answered to patient or proxy's satisfaction: yes      Relevant documents present and verified: yes      Test results available and properly labeled: yes      Radiology Images displayed and confirmed  If images not available, report reviewed: yes      Required blood products, implants, devices, and special equipment available: yes      Site/side marked: yes      Immediately prior to procedure, a time out was called: yes      Patient identity confirmed:  Arm band and hospital-assigned identification number  Pre-procedure details:     Hand hygiene: Hand hygiene performed prior to insertion      Sterile barrier technique: All elements of maximal sterile technique followed      Skin preparation:  2% chlorhexidine    Skin preparation agent: Skin preparation agent completely dried prior to procedure    Indications:     Central line indications: medications requiring central line and hemodynamic monitoring    Sedation:     Sedation type: Anxiolysis  Anesthesia (see MAR for exact dosages):      Anesthesia method:  None  Procedure details:     Location:  Left internal jugular    Vessel type: vein      Laterality:  Left    Approach: percutaneous technique used      Patient position:  Trendelenburg    Catheter type:  Triple lumen 20cm    Catheter size:  7 Fr    Landmarks identified: yes      Ultrasound guidance: yes      Sterile ultrasound techniques: Sterile gel and sterile probe covers were used      Manometry confirmation: yes      Number of attempts:  1    Successful placement: yes      Vessel of catheter tip end:  SVC  Post-procedure details:     Post-procedure:  Dressing applied and line sutured    Assessment:  Blood return through all ports, no pneumothorax on x-ray, free fluid flow and placement verified by x-ray    Post-procedure complications: none      Patient tolerance of procedure:   Tolerated well, no immediate complications

## 2020-03-30 NOTE — ASSESSMENT & PLAN NOTE
Likely d/t Covid (pending)  ARDSnet protocol   Continue High PEEP low Fio2, PEEP currently 18, wean gradually  Start NMB  Monitor strict I/o's, avoid volume overload

## 2020-03-30 NOTE — ASSESSMENT & PLAN NOTE
Baseline sCr unknown, p/w sCr 4 81  Given 2 5 L bolus in ED, galvez inserted  Start Bicarb infusion  Trend UOP closely, repeat BMP, trend renal indices  Nephrology consulted, will likely require dialysis  Universal consent obtained from Granddaughter who will be decision maker in family [place])

## 2020-03-30 NOTE — PROCEDURES
Arterial Line Insertion  Date/Time: 3/30/2020 5:27 PM  Performed by: Jaime Bermudez PA-C  Authorized by: Jaime Bermudez PA-C     Patient location:  Bedside  Consent:     Consent obtained:  Verbal    Consent given by:  Healthcare agent    Risks discussed:  Bleeding, repeat procedure, ischemia, infection and pain  Universal protocol:     Procedure explained and questions answered to patient or proxy's satisfaction: yes      Relevant documents present and verified: yes      Test results available and properly labeled: yes      Radiology Images displayed and confirmed  If images not available, report reviewed: yes      Required blood products, implants, devices, and special equipment available: yes      Site/side marked: yes      Immediately prior to procedure a time out was called: yes      Patient identity confirmed:  Arm band and hospital-assigned identification number  Indications:     Indications: hemodynamic monitoring, multiple ABGs and continuous blood pressure monitoring    Pre-procedure details:     Skin preparation:  Chlorhexidine    Preparation: Patient was prepped and draped in sterile fashion    Sedation:     Sedation type: Anxiolysis  Anesthesia (see MAR for exact dosages): Anesthesia method:  None  Procedure details:     Location / Tip of Catheter:  Radial    Laterality:  Left    Souleymane's test performed: yes      Souleymane's test abnormal: no      Needle gauge:  20 G    Placement technique:  Percutaneous    Number of attempts:  1    Successful placement: yes      Transducer: waveform confirmed    Post-procedure details:     Post-procedure:  Biopatch applied, sutured and sterile dressing applied    CMS:  Normal    Patient tolerance of procedure:   Tolerated well, no immediate complications

## 2020-03-30 NOTE — ED PROVIDER NOTES
History  Chief Complaint   Patient presents with    Shortness of Breath     Pt not feeling well for five days, today presents very SOB  Patient arrives with an   States he had recent travel from the country of [facility] and has been sick for 5 days  Patient states he has been having nausea vomiting and diarrhea without abdominal or chest pain  Today he developed shortness of breath, with tachypnea and tachycardia  He has a mild cough  Patient states he has no fever  He is unaware of any sick contacts while abroad  Patient arrives in respiratory distress  Tachypneic and hypoxic  None       Past Medical History:   Diagnosis Date    Diabetes mellitus ([facility] [address] )     Hypertension        History reviewed  No pertinent surgical history  History reviewed  No pertinent family history  I have reviewed and agree with the history as documented  E-Cigarette/Vaping     E-Cigarette/Vaping Substances     Social History     Tobacco Use    Smoking status: Never Smoker    Smokeless tobacco: Never Used   Substance Use Topics    Alcohol use: Never     Frequency: Never    Drug use: Never       Review of Systems   Constitutional: Positive for appetite change  Negative for chills and fever  HENT: Positive for sore throat  Negative for congestion  Eyes: Negative for visual disturbance  Respiratory: Positive for cough and shortness of breath  Cardiovascular: Negative for chest pain, palpitations and leg swelling  Gastrointestinal: Positive for diarrhea, nausea and vomiting  Negative for abdominal pain  Genitourinary: Negative for difficulty urinating and dysuria  Musculoskeletal: Negative for arthralgias and back pain  Skin: Negative for rash  Neurological: Negative for headaches  Hematological: Does not bruise/bleed easily  Psychiatric/Behavioral: Negative for confusion  All other systems reviewed and are negative        Physical Exam  Physical Exam   Constitutional: He appears well-developed and well-nourished  HENT:   Head: Normocephalic and atraumatic  Mouth/Throat: Oropharynx is clear and moist    Eyes: Pupils are equal, round, and reactive to light  Neck: Normal range of motion  Neck supple  Cardiovascular: Regular rhythm and normal heart sounds  Tachy but regular   Pulmonary/Chest: Accessory muscle usage present  Tachypnea noted  He is in respiratory distress  He has decreased breath sounds  He has no wheezes  He has no rhonchi  He has no rales  Nursing note and vitals reviewed        Vital Signs  ED Triage Vitals   Temperature Pulse Respirations Blood Pressure SpO2   03/30/20 1039 03/30/20 1039 03/30/20 1039 03/30/20 1045 03/30/20 1026   98 5 °F (36 9 °C) (!) 126 (!) 40 143/80 98 %      Temp Source Heart Rate Source Patient Position - Orthostatic VS BP Location FiO2 (%)   03/30/20 1039 03/30/20 1039 03/30/20 1140 03/30/20 1140 --   Tympanic Monitor Lying Left arm       Pain Score       03/30/20 1039       No Pain           Vitals:    03/30/20 1300 03/30/20 1315 03/30/20 1330 03/30/20 1345   BP: 120/67 128/70 105/70 (!) 152/101   Pulse: 104 100 (!) 106 (!) 122   Patient Position - Orthostatic VS:             Visual Acuity      ED Medications  Medications   propofol (DIPRIVAN) 1000 mg in 100 mL infusion (premix) (7 937 mcg/kg/min × 105 kg Intravenous New Bag 3/30/20 1303)   chlorhexidine (PERIDEX) 0 12 % oral rinse 15 mL (has no administration in time range)   sodium chloride 0 9 % infusion (0 mL/hr Intravenous Stopped 3/30/20 1240)   cefTRIAXone (ROCEPHIN) IVPB (premix) 1,000 mg (0 mg Intravenous Stopped 3/30/20 1214)   azithromycin (ZITHROMAX) 500 mg in sodium chloride 0 9% 250mL IVPB 500 mg (500 mg Intravenous New Bag 3/30/20 1215)   calcium gluconate 1 g in sodium chloride 0 9% 50 mL (premix) (0 g Intravenous Stopped 3/30/20 1217)   insulin regular (HumuLIN R,NovoLIN R) injection 10 Units (10 Units Intravenous Given 3/30/20 1201)   dextrose 50 % IV solution 13 mL (13 mL Intravenous Given 3/30/20 1201)   sodium polystyrene (KAYEXALATE) powder 15 g (15 g Oral Given 3/30/20 1201)   etomidate (AMIDATE) 2 mg/mL injection 20 mg (20 mg Intravenous Given 3/30/20 1324)   Succinylcholine Chloride 100 mg/5 mL syringe 100 mg (100 mg Intravenous Given 3/30/20 1302)   midazolam (VERSED) injection 1 mg (1 mg Intravenous Given 3/30/20 1240)       Diagnostic Studies  Results Reviewed     Procedure Component Value Units Date/Time    2019 Novel Coronavirus (COVID-19), ERON [[id number]] Collected:  03/30/20 1052    Lab Status: In process Specimen:  Nasopharyngeal Swab Updated:  03/30/20 1341    Influenza A/B and RSV PCR [[id number]]  (Normal) Collected:  03/30/20 1052    Lab Status:  Final result Specimen:  Nasopharyngeal Swab Updated:  03/30/20 1140     INFLUENZA A PCR None Detected     INFLUENZA B PCR None Detected     RSV PCR None Detected    CBC and differential [[id number]]  (Abnormal) Collected:  03/30/20 1048    Lab Status:  Final result Specimen:  Blood from Arm, Right Updated:  03/30/20 1135     WBC 11 43 Thousand/uL      RBC 4 82 Million/uL      Hemoglobin 14 0 g/dL      Hematocrit 43 7 %      MCV 91 fL      MCH 29 0 pg      MCHC 32 0 g/dL      RDW 13 8 %      MPV 12 3 fL      Platelets 758 Thousands/uL      nRBC 0 /100 WBCs     Narrative: This is an appended report  These results have been appended to a previously verified report  Blood culture #1 [[id number]] Collected:  03/30/20 1118    Lab Status: In process Specimen:  Blood from Hand, Right Updated:  03/30/20 1129    Lactic acid x2 [[id number]]  (Abnormal) Collected:  03/30/20 1048    Lab Status:  Final result Specimen:  Blood Updated:  03/30/20 1123     LACTIC ACID 4 9 mmol/L     Narrative:       Result may be elevated if tourniquet was used during collection      Troponin I [[id number]]  (Normal) Collected:  03/30/20 1048    Lab Status:  Final result Specimen:  Blood from Arm, Right Updated:  03/30/20 1118     Troponin I <0 02 ng/mL     Comprehensive metabolic panel [[id number]]  (Abnormal) Collected:  03/30/20 1048    Lab Status:  Final result Specimen:  Blood from Arm, Right Updated:  03/30/20 1115     Sodium 135 mmol/L      Potassium 5 9 mmol/L      Chloride 99 mmol/L      CO2 16 mmol/L      ANION GAP 20 mmol/L      BUN 68 mg/dL      Creatinine 4 81 mg/dL      Glucose 270 mg/dL      Calcium 8 8 mg/dL      AST 80 U/L      ALT 38 U/L      Alkaline Phosphatase 59 U/L      Total Protein 8 8 g/dL      Albumin 3 2 g/dL      Total Bilirubin 0 40 mg/dL      eGFR 11 ml/min/1 73sq m     Narrative:       [place] guidelines for Chronic Kidney Disease (CKD):     Stage 1 with normal or high GFR (GFR > 90 mL/min/1 73 square meters)    Stage 2 Mild CKD (GFR = 60-89 mL/min/1 73 square meters)    Stage 3A Moderate CKD (GFR = 45-59 mL/min/1 73 square meters)    Stage 3B Moderate CKD (GFR = 30-44 mL/min/1 73 square meters)    Stage 4 Severe CKD (GFR = 15-29 mL/min/1 73 square meters)    Stage 5 End Stage CKD (GFR <15 mL/min/1 73 square meters)  Note: GFR calculation is accurate only with a steady state creatinine    APTT [[id number]]  (Normal) Collected:  03/30/20 1048    Lab Status:  Final result Specimen:  Blood from Arm, Right Updated:  03/30/20 1110     PTT 32 seconds     Protime-INR [[id number]]  (Normal) Collected:  03/30/20 1048    Lab Status:  Final result Specimen:  Blood from Arm, Right Updated:  03/30/20 1110     Protime 13 8 seconds      INR 1 02    Procalcitonin [[id number]] Updated:  03/30/20 1105    Lab Status:  No result Specimen:  Blood from Arm, Right     Blood culture #2 [[id number]] Collected:  03/30/20 1048    Lab Status:   In process Specimen:  Blood from Arm, Right Updated:  03/30/20 1054    UA w Reflex to Microscopic w Reflex to Culture [[id number]]     Lab Status:  No result Specimen:  Urine                  XR chest 1 view portable   Final Result by Perez August MD (03/30 1147)      No active pulmonary disease on examination which is somewhat limited secondary to low lung volumes  Workstation performed: [id number]         XR chest 1 view portable    (Results Pending)              Procedures  ECG 12 Lead Documentation Only  Date/Time: 3/30/2020 10:42 AM  Performed by: Carla Valdivia MD  Authorized by: Carla Valdivia MD     Indications / Diagnosis:  Short of breath  ECG reviewed by me, the ED Provider: yes    Patient location:  ED  Interpretation:     Interpretation: abnormal    Rate:     ECG rate:  122    ECG rate assessment: normal    Rhythm:     Rhythm: sinus tachycardia    Ectopy:     Ectopy: none    QRS:     QRS axis:  Normal    QRS intervals:  Normal  Conduction:     Conduction: abnormal      Abnormal conduction: complete RBBB    ST segments:     ST segments:  Normal  T waves:     T waves: normal      Intubation  Date/Time: 3/30/2020 1:26 PM  Performed by: Carla Valdivia MD  Authorized by: Carla Valdivia MD     Patient location:  ED  Other Assisting Provider: No    Consent:     Consent obtained:  Verbal    Consent given by:  Patient  Universal protocol:     Patient identity confirmed:  Verbally with patient  Pre-procedure details:     Patient status:  Awake    Mallampati score:  2    Pretreatment medications:  Midazolam, etomidate and propofol    Paralytics:  Succinylcholine    Sedation type (ED):  Moderate (conscious) sedation (See separate ED Procedural Sedation form)  Indications:     Indications for intubation: respiratory distress    Procedure details:     Preoxygenation:  Bag valve mask    CPR in progress: no      Intubation method:  Oral    Oral intubation technique:  Glidescope    Laryngoscope blade:   Mac 4    Tube size (mm):  8 0    Tube type:  Cuffed    Number of attempts:  1  Placement assessment:     ETT to lip:  23    Tube secured with:  ETT kohli    Breath sounds:  Equal    Placement verification: chest rise, condensation, CXR verification, ETCO2 detector, fiberoptic scope and tube exhalation      CXR findings:  ETT in proper place  Post-procedure details:     Patient tolerance of procedure: Tolerated well, no immediate complications             ED Course                                 MDM  Number of Diagnoses or Management Options  Hyperkalemia:   Hypoxia:   Pneumonia:   Renal failure:   Diagnosis management comments: I suspect patient may have COVID-19 infection  Patient was intubated in the emergency department, and admitted to critical care ICU        Disposition  Final diagnoses:   Pneumonia   Hypoxia   Renal failure   Hyperkalemia     Time reflects when diagnosis was documented in both MDM as applicable and the Disposition within this note     Time User Action Codes Description Comment    3/30/2020  1:51 PM Velvet Hoe Add [J18 9] Pneumonia     3/30/2020  1:51 PM Velvet Hoe Add [R06 00] Dyspnea     3/30/2020  1:52 PM Velvet Hoe Add [R09 02] Hypoxia     3/30/2020  1:52 PM Velvet Hoe Remove [R06 00] Dyspnea     3/30/2020  1:52 PM Jacqulynn Cedars A Add [N19] Renal failure     3/30/2020  1:52 PM Jacqulynn Cedars A Add [E87 5] Hyperkalemia       ED Disposition     ED Disposition Condition Date/Time Comment    Admit Stable Mon Mar 30, 2020  1:51 PM Case was discussed with Dr Nicko Licona and the patient's admission status was agreed to be Admission Status: inpatient status to the service of Dr Nicko Licona   Follow-up Information    None         Patient's Medications    No medications on file     No discharge procedures on file      PDMP Review     None          ED Provider  Electronically Signed by           Sabrina Boothe MD  03/30/20 7593

## 2020-03-30 NOTE — ASSESSMENT & PLAN NOTE
Intubated 3/30 for progressive Hypoxia and increased SOB  Covid swab pending  ETT day 1 size 8, secured at 23 @ lips  Continue mechanical ventilation, current settings 18/400/100/18  ABG daily

## 2020-03-30 NOTE — ASSESSMENT & PLAN NOTE
3/30 Covid swab pending  Start Azithromycin and HQ x 5 days  Baseline Qtc 473, continue to monitor daily  Sq heparin tid dvt ppx

## 2020-03-30 NOTE — CONSULTS
2           Consultation - Nephrology   Ellen Barrett 78 y o  male MRN: [id number]  Unit/Bed#: ICU 01 Encounter: [id number]      Assessment/Plan     Assessment / Plan:  1  Renal    The patient presents critically ill intubated and was found to have renal failure with a creatinine of 4 8  The patient is currently intubated so unable to provide any history for me  According to family there was no history of kidney failure so this very well could be related either to pre renal azotemia has reportedly he was having diarrhea and nausea and vomiting and has not been eating well verses ATN  I spoke to the ICU team including the attending and was felt since he had just arrived in ICU were going to try to give IV fluids to see if can improve renal function  If there is no improvement then that will suggest the patient is in ATN  The family has signed universal consent so if renal replacement therapy is required I have asked the ICU team to contact me and we can initiate the patient on CRRT  Monitor renal function and response to IV fluids  If no improvement likely will need renal replacement therapy  Start half-normal saline with 75 mEq of sodium bicarb per L at 80 cc/hour and follow  This was discussed with the ICU team directly and they are in agreement with the plan  2  Metabolic acidosis    This appears to be a gap metabolic acidosis although there was no ABG for full interpretation at the time I looked  The anion gap has gone up 8 the bicarb is fell about 8 so I suspect this is lactic acidosis as that level was elevated  Will monitor with supportive care and have added bicarbonate to IV fluids  3  Hyperkalemia    The patient a potassium of 5 9 on presentation  He was given some acute treatment with Kayexalate and that dose will be repeated  That specimen was hemolyzed according to lab so ICU team has recent labs and will await the results  Monitor with IV fluids      4  Respiratory failure    I personally reviewed the report of the chest x-ray showing diffuse bilateral lung opacities  There is concern that the patient has COVID 19 and will be treated accordingly  ICU team is managing  Patient is intubated  History of Present Illness   Physician Requesting Consult: Scar Esteves MD  Reason for Consult / Principal Problem:  Acute renal failure  Hx and PE limited by:   HPI: Sumit Young is a 78y o  year old male who apparently arrived from [facility] due to a death in his family in the [place]  Apparently he was sick for about 5 days prior to coming in with nausea vomiting diarrhea  He began to develop shortness of breath presented to the emergency room  His respiratory status worsened and he has been intubated and were asked to see him regarding acute renal failure  History obtained from chart review as unable to obtain from patient given his intubated clinical status  The patient was initially seen by me in the emergency room and now has been transferred over to intensive care unit  Inpatient consult to Nephrology  Consult performed by: Ella Hoffman MD  Consult ordered by: Tish Dye PA-C          Review of Systems  Unable to get review of systems from patient given his clinical state  Historical Information   Patient Active Problem List   Diagnosis    Suspected Covid-19 Virus Infection    JESÚS (acute kidney injury) ([facility] )    [facility] distress [facility]) [facility]    Hyperkalemia    Lactic acidosis    DM2 (diabetes mellitus, type 2) ([facility] )    Hyperglycemia     Past Medical History:   Diagnosis Date    Diabetes mellitus ([facility] )     Hypertension      History reviewed  No pertinent surgical history    Social History   Social History     Substance and Sexual Activity   Alcohol Use Never    Frequency: Never     Social History     Substance and Sexual Activity   Drug Use Never     Social History     Tobacco Use   Smoking Status Never Smoker   Smokeless Tobacco Never Used     History reviewed  No pertinent family history  Meds/Allergies   current meds:   Current Facility-Administered Medications   Medication Dose Route Frequency    vecuronium (NORCURON) 20 mg in sodium chloride 0 9 % 100 mL infusion  0 8-2 5 mcg/kg/min Intravenous Titrated    And    artificial tear (LUBRIFRESH P M ) ophthalmic ointment   Both Eyes Q2H    chlorhexidine (PERIDEX) 0 12 % oral rinse 15 mL  15 mL Swish & Spit Q12H [address]    fentanyl citrate (PF) 100 MCG/2ML 50 mcg  50 mcg Intravenous Q1H PRN    heparin (porcine) subcutaneous injection 5,000 Units  5,000 Units Subcutaneous Q8H [address]    insulin regular (HumuLIN R,NovoLIN R) 1 Units/mL in sodium chloride 0 9 % 100 mL infusion  0 3-21 Units/hr Intravenous Titrated    propofol (DIPRIVAN) 1000 mg in 100 mL infusion (premix)  5-50 mcg/kg/min Intravenous Titrated    sodium bicarbonate 75 mEq in sodium chloride 0 45 % 1,000 mL infusion  80 mL/hr Intravenous Continuous    sodium polystyrene (KAYEXALATE) powder 15 g  15 g Oral Once    vecuronium (NORCURON) injection 10 mg  10 mg Intravenous Once     No Known Allergies    Objective     Intake/Output Summary (Last 24 hours) at 3/30/2020 1508  Last data filed at 3/30/2020 1404  Gross per 24 hour   Intake 2350 ml   Output    Net 2350 ml     Body mass index is 33 34 kg/m²  Invasive Devices:   Urethral Catheter Non-latex 16 Fr  (Active)   Site Assessment Clean;Skin intact 3/30/2020  1:37 PM   Collection Container Standard drainage bag 3/30/2020  1:37 PM   Securement Method Securing device (Describe) 3/30/2020  1:37 PM       PHYSICAL EXAM:  /95   Pulse (!) 116   Temp 98 5 °F (36 9 °C) (Tympanic)   Resp (!) 25   Ht 5' 10" (1 778 m)   Wt 105 kg (232 lb 5 8 oz)   SpO2 (!) 89%   BMI 33 34 kg/m²     Physical Exam   Constitutional: He appears toxic  He appears ill  No distress  Neck: No JVD present  Cardiovascular: Regular rhythm  Exam reveals no gallop and no friction rub  Tachycardic  Pulmonary/Chest: He has no wheezes  He has rhonchi  He has no rales  On ventilator  Abdominal: Soft  Bowel sounds are normal  He exhibits no distension and no ascites  Neurological:   Unable to assess due to his clinical state           Current Weight: Weight - Scale: 105 kg (232 lb 5 8 oz)  First Weight: Weight - Scale: 97 1 kg (214 lb)    Lab Results:    Results from last 7 days   Lab Units 03/30/20  1048   WBC Thousand/uL 11 43*   HEMOGLOBIN g/dL 14 0   HEMATOCRIT % 43 7   PLATELETS Thousands/uL 213     Results from last 7 days   Lab Units 03/30/20  1048   POTASSIUM mmol/L 5 9*   CHLORIDE mmol/L 99*   CO2 mmol/L 16*   BUN mg/dL 68*   CREATININE mg/dL 4 81*   CALCIUM mg/dL 8 8     Results from last 7 days   Lab Units 03/30/20  1048   POTASSIUM mmol/L 5 9*   CHLORIDE mmol/L 99*   CO2 mmol/L 16*   BUN mg/dL 68*   CREATININE mg/dL 4 81*   CALCIUM mg/dL 8 8   ALK PHOS U/L 59   ALT U/L 38   AST U/L 80*

## 2020-03-30 NOTE — H&P
MICHELL&Pako Yolanda 1950, 78 y o  male MRN: [id number]    Unit/Bed#: ICU 01 Encounter: [id number]    Primary Care Provider: No primary care provider on file  Date and time admitted to hospital: 3/30/2020 10:37 AM    * ARDS (adult respiratory distress syndrome) ([facility])  Assessment & Plan  Likely d/t Covid (pending)  ARDSnet protocol   Continue High PEEP low Fio2, PEEP currently 20, wean gradually  Monitor strict I/o's, avoid volume overload    Suspected Covid-19 Virus Infection  Assessment & Plan  3/30 Covid swab pending  Start Azithromycin and HQ x 5 days  Baseline Qtc 473, continue to monitor daily  Sq heparin tid dvt ppx    Acute Hypoxic respiratory failure ([facility] )  Assessment & Plan  Intubated 3/30 for progressive Hypoxia and increased SOB  Covid swab pending  ETT day 1 size 8, secured at 23 @ lips  Continue mechanical ventilation, current settings 18/400/100/18  ABG daily    JESÚS (acute kidney injury) ([facility] )  Assessment & Plan  Baseline sCr unknown, p/w sCr 4 81  Given 2 5 L bolus in ED, galvez inserted  Trend UOP closely, repeat BMP, trend renal indices  Nephrology consulted, will likely require dialysis  Universal consent obtained from Granddaughter who will be decision maker in family [facility])    UTI (urinary tract infection)  Assessment & Plan  UA consistent with infection  Start Rocephin  Follow culture      DM2 (diabetes mellitus, type 2) ([facility] )  Assessment & Plan  No results found for: HGBA1C    Recent Labs     03/30/20  1536   POCGLU 257*       Blood Sugar Average: Last 72 hrs:  (P) 257     Hgb A1c ordered  Start insulin gtt     -------------------------------------------------------------------------------------------------------------  Chief Complaint: AMS, SOB    History of Present Illness   HX and PE limited by: AMS and SOB  Dain England is a 78 y o  male with pmh of Dm2, HTN, HLD, and CAD  The last few days the family states that the patient has had a non-productive cough, sob, n/v, and diarrhea  He has refused to seek care prior to today when he developed progressive dyspnea, sob, and AMS  EMS was called and he was brought into the ED, required 15L NRB, spo2 remained 85% and he ultimately required intubation  His labs revealed sCr of 4 81, mild leukocytosis, LA 4 9  Pct pending  CXR shows diffuse b/l lung opacities consistent with covid  Bcx and Swabs performed  History obtained from chart review  -------------------------------------------------------------------------------------------------------------  Dispo: Continue Critical Care     Code Status: Level 1 - Full Code  --------------------------------------------------------------------------------------------------------------  Review of Systems   Unable to perform ROS: Intubated       Review of systems was unable to be performed secondary to intubated    Physical Exam   Constitutional: He appears well-developed  No distress  HENT:   Head: Normocephalic and atraumatic  Eyes: Pupils are equal, round, and reactive to light  Conjunctivae are normal    Neck: Normal range of motion  Neck supple  Cardiovascular: Normal rate, regular rhythm, normal heart sounds and intact distal pulses  No murmur heard  Pulmonary/Chest: Effort normal and breath sounds normal  No respiratory distress  He has no wheezes  Clear mechanical breath sounds b/l   Abdominal: Soft  Bowel sounds are normal  He exhibits no distension  There is no tenderness  Genitourinary:   Genitourinary Comments: Morrison     Musculoskeletal: He exhibits no edema  Neurological:   Intubated/sedated   Skin: Skin is warm and dry  Capillary refill takes less than 2 seconds  He is not diaphoretic  Nursing note and vitals reviewed      --------------------------------------------------------------------------------------------------------------  Vitals:   Vitals:    03/30/20 1345 03/30/20 1400 03/30/20 1415 03/30/20 1442   BP: (!) 152/101 (!) 153/102 130/95    BP Location:       Pulse: (!) 122 (!) 130 (!) 116    Resp: (!) 24 (!) 26 (!) 25    Temp:       TempSrc:       SpO2: 90% 90% (!) 89% (!) 89%   Weight:       Height:         Temp  Min: 98 5 °F (36 9 °C)  Max: 98 5 °F (36 9 °C)  IBW: 73 kg  Height: 5' 10" (177 8 cm)  Body mass index is 33 34 kg/m²  Laboratory and Diagnostics:  Results from last 7 days   Lab Units 03/30/20  1048   WBC Thousand/uL 11 43*   HEMOGLOBIN g/dL 14 0   HEMATOCRIT % 43 7   PLATELETS Thousands/uL 213   BANDS PCT % 7   MONO PCT % 10     Results from last 7 days   Lab Units 03/30/20  1048   SODIUM mmol/L 135*   POTASSIUM mmol/L 5 9*   CHLORIDE mmol/L 99*   CO2 mmol/L 16*   ANION GAP mmol/L 20*   BUN mg/dL 68*   CREATININE mg/dL 4 81*   CALCIUM mg/dL 8 8   GLUCOSE RANDOM mg/dL 270*   ALT U/L 38   AST U/L 80*   ALK PHOS U/L 59   ALBUMIN g/dL 3 2*   TOTAL BILIRUBIN mg/dL 0 40          Results from last 7 days   Lab Units 03/30/20  1048   INR  1 02   PTT seconds 32      Results from last 7 days   Lab Units 03/30/20  1048   TROPONIN I ng/mL <0 02     Results from last 7 days   Lab Units 03/30/20  1048   LACTIC ACID mmol/L 4 9*     ABG:    VBG:          Micro:        EKG: NSR qtc 473  Imaging: I have personally reviewed pertinent reports  and I have personally reviewed pertinent films in PACS      Historical Information   Past Medical History:   Diagnosis Date    Diabetes mellitus ([facility] )     Hypertension      History reviewed  No pertinent surgical history  Social History   Social History     Substance and Sexual Activity   Alcohol Use Never    Frequency: Never     Social History     Substance and Sexual Activity   Drug Use Never     Social History     Tobacco Use   Smoking Status Never Smoker   Smokeless Tobacco Never Used     Exercise History: unable to assess d/t intubation, patient family states that he was prior in good health, independent and able to walk several blocks without stopping  Family History:   History reviewed  No pertinent family history    I have reviewed this patient's family history and commented on sigificant items within the [facility]      Medications:  Current Facility-Administered Medications   Medication Dose Route Frequency    vecuronium (NORCURON) 20 mg in sodium chloride 0 9 % 100 mL infusion  0 8-2 5 mcg/kg/min Intravenous Titrated    And    artificial tear (LUBRIFRESH P M ) ophthalmic ointment   Both Eyes Q2H    [START ON 3/31/2020] azithromycin (ZITHROMAX) tablet 500 mg  500 mg Oral Q24H    chlorhexidine (PERIDEX) 0 12 % oral rinse 15 mL  15 mL Swish & Spit Q12H [facility]    fentanyl citrate (PF) 100 MCG/2ML 50 mcg  50 mcg Intravenous Q1H PRN    heparin (porcine) subcutaneous injection 5,000 Units  5,000 Units Subcutaneous Q8H [facility]    HYDROmorphone (DILAUDID) injection 2 mg  2 mg Intravenous Once    hydroxychloroquine (PLAQUENIL) oral suspension 600 mg  600 mg Oral BID With Meals    Followed by   Radha Ours ON 3/31/2020] hydroxychloroquine (PLAQUENIL) oral suspension 200 mg  200 mg Oral TID With Meals    insulin regular (HumuLIN R,NovoLIN R) 1 Units/mL in sodium chloride 0 9 % 100 mL infusion  0 3-21 Units/hr Intravenous Titrated    midazolam (VERSED) injection 4 mg  4 mg Intravenous Once    propofol (DIPRIVAN) 1000 mg in 100 mL infusion (premix)  5-50 mcg/kg/min Intravenous Titrated    sodium bicarbonate 75 mEq in sodium chloride 0 45 % 1,000 mL infusion  80 mL/hr Intravenous Continuous    sodium polystyrene (KAYEXALATE) powder 15 g  15 g Oral Once    sterile water injection **ADS Override Pull**         Home medications:  None     Allergies:  No Known Allergies    ------------------------------------------------------------------------------------------------------------  Advance Directive and Living Will:      Power of :    POLST:    ------------------------------------------------------------------------------------------------------------  Anticipated Length of Stay is > 2 midnights    Care Time Delivered:   Upon my evaluation, this patient had a high probability of imminent or life-threatening deterioration due to respiratory failure, renal failure, which required my direct attention, intervention, and personal management  I have personally provided 120 minutes (14:00 to 16:00) of critical care time, exclusive of procedures, teaching, family meetings, and any prior time recorded by providers other than myself  Michael August PA-C        Portions of the record may have been created with voice recognition software  Occasional wrong word or "sound a like" substitutions may have occurred due to the inherent limitations of voice recognition software    Read the chart carefully and recognize, using context, where substitutions have occurred

## 2020-03-30 NOTE — PROCEDURES
Temporary HD Catheter  Date/Time: 3/30/2020 6:51 PM  Performed by: GAYE Palmer  Authorized by: GAYE Palmer     Patient location:  Bedside  Consent:     Consent obtained:  Verbal    Consent given by:  Patient    Risks discussed:  Arterial puncture, incorrect placement, nerve damage, pneumothorax, infection and bleeding    Alternatives discussed:  No treatment  Universal protocol:     Procedure explained and questions answered to patient or proxy's satisfaction: yes      Relevant documents present and verified: yes      Test results available and properly labeled: yes      Radiology Images displayed and confirmed  If images not available, report reviewed: yes      Required blood products, implants, devices, and special equipment available: yes      Site/side marked: yes      Immediately prior to procedure, a time out was called: yes      Patient identity confirmed:  Arm band and hospital-assigned identification number  Pre-procedure details:     Hand hygiene: Hand hygiene performed prior to insertion      Sterile barrier technique: All elements of maximal sterile technique followed      Skin preparation:  2% chlorhexidine    Skin preparation agent: Skin preparation agent completely dried prior to procedure    Indications:     Central line indications: hemodynamic monitoring and dialysis    Sedation:     Sedation type: Anxiolysis  Anesthesia (see MAR for exact dosages):      Anesthesia method:  None  Procedure details:     Location:  Right internal jugular    Vessel type: vein      Laterality:  Right    Approach: percutaneous technique used      Patient position:  Flat    Catheter type:  Double lumen    Catheter size:  13 5 Fr    Catheter length:  16 cm    Landmarks identified: yes      Ultrasound guidance: no      Number of attempts:  1    Successful placement: yes      Vessel of catheter tip end:  SVC  Post-procedure details:     Post-procedure:  Line sutured and dressing applied    Assessment: Blood return through all ports    Post-procedure complications: none      Patient tolerance of procedure:   Tolerated well, no immediate complications

## 2020-03-31 PROBLEM — E87.2 LACTIC ACIDOSIS: Status: RESOLVED | Noted: 2020-01-01 | Resolved: 2020-01-01

## 2020-03-31 NOTE — PROGRESS NOTES
NEPHROLOGY PROGRESS NOTE    Wesly Howard 78 y o  male MRN: [id number]  Unit/Bed#: ICU 01 Encounter: [id number]  Reason for Consult:  Acute renal failure    The patient had drop in urine output worsening hyperkalemia and renal function was started on CVVH last evening  Patient is now on pressor support remains intubated  I communicated through the glass and the patient was visualized through the class with the nurse and primary team and this was done given concerns for COVID19 infection  ASSESSMENT/PLAN:  1  Renal    The patient has acute renal failure and presumptively it is due to ATN  Urine output has increased but given worsening clinical picture hyperkalemia patient was initiated on renal replacement therapy  He now is on some pressor support with hypotension  Will continue this modality  Hyperkalemia is improved with last potassium level being 4 4  Also metabolic acidosis is resolved as well  I have made some adjustments in CVVH orders so please refer to prescription below  Can discontinue IV fluids with bicarb  CVVH with replacement fluid 4 potassium concentration at 2000 cc/hour  Blood flow 250 cc/minute with pre filter Heparin  UF -50 cc/hour as tolerated to try reduce lung water to improve oxygenation  2  Pulmonary    The patient currently has ARDS and is intubated in the ICU  High suspicion of COVID19 infection  Receiving treatment ceftriaxone, azithromycin and hydroxychloroquine  ICU team managing  3  UTI    Urinalysis suggested urinary tract infection  He is on ceftriaxone  SUBJECTIVE:  ROS  Patient is unable to provide any review of systems given clinical state intubation  He was visualized through the glass door and I communicated with the nurse regarding his status    OBJECTIVE:  Current Weight: Weight - Scale: 112 kg (245 lb 13 oz)  Vitals:Temp (24hrs), Av 7 °F (36 5 °C), Min:96 8 °F (36 °C), Max:99 °F (37 2 °C)  Current: Temperature: (!) 96 8 °F (36 °C)   Blood pressure 92/71, pulse 95, temperature (!) 96 8 °F (36 °C), resp  rate (!) 30, height 5' 10" (1 778 m), weight 112 kg (245 lb 13 oz), SpO2 96 %  , Body mass index is 35 27 kg/m²        Intake/Output Summary (Last 24 hours) at 3/31/2020 9058  Last data filed at 3/31/2020 0700  Gross per 24 hour   Intake 5133 92 ml   Output 2687 ml   Net 2446 92 ml       Physical Exam: BP 92/71   Pulse 95   Temp (!) 96 8 °F (36 °C)   Resp (!) 30   Ht 5' 10" (1 778 m)   Wt 112 kg (245 lb 13 oz)   SpO2 96%   BMI 35 27 kg/m²   Physical Exam  Given the concerns of COVID19 infection patient was not personally examined but I discussed with the nurse and primary team   Medications:    Current Facility-Administered Medications:     azithromycin (ZITHROMAX) tablet 500 mg, 500 mg, Oral, Q24H, Darren Rasheed PA-C    cefTRIAXone (ROCEPHIN) IVPB (premix) 1,000 mg, 1,000 mg, Intravenous, Q24H, Darren Rasheed PA-C    chlorhexidine (PERIDEX) 0 12 % oral rinse 15 mL, 15 mL, Swish & [employer], Q12H [address], Darren Rasheed PA-C, 15 mL at 03/31/20 3157    fentanyl citrate (PF) 100 MCG/2ML 50 mcg, 50 mcg, Intravenous, Q1H PRN, Darren Rasheed PA-C    heparin (porcine) 25,000 units in 250 mL infusion (premix), 600 Units/hr, Intravenous, Continuous, Darren Rasheed PA-C, Last Rate: 6 mL/hr at 03/31/20 0345, 600 Units/hr at 03/31/20 0345    HYDROmorphone (DILAUDID) 50 mg in sodium chloride 0 9% 50mL drip, 0 5 mg/hr, Intravenous, Continuous, Nara Castellano PA-C, Stopped at 03/31/20 0551    [COMPLETED] hydroxychloroquine (PLAQUENIL) oral suspension 600 mg, 600 mg, Oral, BID With Meals, 600 mg at 03/31/20 6187 **FOLLOWED BY** hydroxychloroquine (PLAQUENIL) oral suspension 200 mg, 200 mg, Oral, TID With Meals, Darren Rasheed PA-C    influenza vaccine, high-dose (FLUZONE HIGH-DOSE) IM injection MEHUL 0 5 mL, 0 5 mL, Intramuscular, Once, Lisa Hernandez MD    insulin regular (HumuLIN R,NovoLIN R) 1 Units/mL in sodium chloride 0 9 % 100 mL infusion, 0 3-21 Units/hr, Intravenous, Titrated, JAVON Kelley-C, Last Rate: 2 mL/hr at 03/31/20 0824, 2 Units/hr at 03/31/20 6835    midazolam (VERSED) injection 1 mg, 1 mg, Intravenous, Q2H PRN, Miguel Hagen PA-C    midazolam (VERSED) injection 4 mg, 4 mg, Intravenous, Once, Josh Ho PA-C    norepinephrine (LEVOPHED) 8 mg (DOUBLE CONCENTRATION) IV in sodium chloride 0 9% 250 mL, 1-30 mcg/min, Intravenous, Titrated, Miguel Hagen PA-C, Stopped at 03/31/20 0402    NxStage K 4/Ca 3 dialysis solution (RFP-401) 20,000 mL, 20,000 mL, Dialysis, Continuous, Kvng Rueda MD, 20,000 mL at 03/31/20 0643    pneumococcal 13-valent conjugate vaccine (PREVNAR-13) IM injection 0 5 mL, 0 5 mL, Intramuscular, Prior to discharge, Teo Soto MD    propofol (DIPRIVAN) 1000 mg in 100 mL infusion (premix), 5-50 mcg/kg/min, Intravenous, Titrated, Trinity Patterson MD, Last Rate: 12 6 mL/hr at 03/31/20 0822, 20 mcg/kg/min at 03/31/20 3407    Laboratory Results:  Lab Results   Component Value Date    WBC 16 88 (H) 03/31/2020    HGB 13 0 03/31/2020    HCT 40 3 03/31/2020    MCV 89 03/31/2020     03/31/2020     Lab Results   Component Value Date    SODIUM 140 03/31/2020    K 4 4 03/31/2020     03/31/2020    CO2 26 03/31/2020    BUN 42 (H) 03/31/2020    CREATININE 2 71 (H) 03/31/2020    GLUC 151 (H) 03/31/2020    CALCIUM 8 4 03/31/2020     Lab Results   Component Value Date    CALCIUM 8 4 03/31/2020    PHOS 3 8 03/31/2020     No results found for: LABPROT

## 2020-03-31 NOTE — TREATMENT PLAN
Phone discusson with patient's daughter Dusty Dejesus regarding the patient's current clincial status  Discussed patient's acute hypoxic respiratory failure, acute kidney injury, the initiation of CRRT yesterday       Lisa Hernandez MD

## 2020-03-31 NOTE — SOCIAL WORK
LOS - 1 day    SW following to monitor needs and assist with planning  Pt currently in ICU on vent and CVVHD  COVID-19 suspected, test pending  SW spoke briefly with family friend, Tyrell Thorne, and daughter, Nato Hudson  Per friend and daughter pt is visiting from [facility]  Pt's trip was due to his son's accident  Pt's son, Ronan Lewis, was in a car accident in [place] and is currently hospitalized down there  Son also lost his wife and child in the accident  Offered continued support and assistance to friend and daughter  Gave daughter SW phone number for future reference  SW will follow to monitor progress and support/assist as needed

## 2020-03-31 NOTE — TREATMENT PLAN
Renal on call note  -Was called by icu AP for labs showing K 4 9 despite CVVHD  -Changed CVVHD prescription do use 2k bath with 3 calcium dialysate fluid and increased DFR to 2500 ml/hr   Continue  ml/min and negative UF 50 ml/hr

## 2020-03-31 NOTE — ASSESSMENT & PLAN NOTE
3/30 Covid swab pending  Continue Plaquenil/Azithromycin and HQ x 5 days  Baseline Qtc 473, continue to monitor daily  Sq heparin tid dvt ppx

## 2020-03-31 NOTE — ASSESSMENT & PLAN NOTE
Lab Results   Component Value Date    HGBA1C 7 1 (H) 03/30/2020       Recent Labs     03/31/20  0010 03/31/20  0149 03/31/20  0357 03/31/20  0558   POCGLU 172* 182* 138 182*       Blood Sugar Average: Last 72 hrs:  (P) 190     Hgb A1c ordered  Start insulin gtt

## 2020-03-31 NOTE — UTILIZATION REVIEW
Initial Clinical Review    Admission: Date/Time/Statement: Admission Orders (From admission, onward)     Ordered        03/30/20 1357  Inpatient Admission  Once         03/30/20 1353  Inpatient Admission  Once                   Orders Placed This Encounter   Procedures    Inpatient Admission     Standing Status:   Standing     Number of Occurrences:   1     Order Specific Question:   Admitting Physician     Answer:   Marcy Steele [[id number]]     Order Specific Question:   Level of Care     Answer:   Critical Care [15]     Order Specific Question:   Estimated length of stay     Answer:   More than 2 Midnights     Order Specific Question:   Certification     Answer:   I certify that inpatient services are medically necessary for this patient for a duration of greater than two midnights  See H&P and MD Progress Notes for additional information about the patient's course of treatment   Inpatient Admission     Standing Status:   Standing     Number of Occurrences:   1     Order Specific Question:   Admitting Physician     Answer:   Jasmin Mae     Order Specific Question:   Level of Care     Answer:   Critical Care [15]     Order Specific Question:   Estimated length of stay     Answer:   More than 2 Midnights     Order Specific Question:   Certification     Answer:   I certify that inpatient services are medically necessary for this patient for a duration of greater than two midnights  See H&P and MD Progress Notes for additional information about the patient's course of treatment  ED Arrival Information     Expected Arrival Acuity Means of Arrival Escorted By Service Admission Type    - 3/30/2020 10:23 Emergent Walk-In Friend Critical Care/ICU Emergency    Arrival Complaint    Vomiting; Diarrhea; Lethargic        Chief Complaint   Patient presents with    Shortness of Breath     Pt not feeling well for five days, today presents very SOB  Assessment/Plan:   78 yom ambulatory to er from [place]   States he had recent travel from the country of [facility] and has been sick for 5 days  Patient states he has been having nausea vomiting and diarrhea without abdominal or chest pain  Today he developed shortness of breath, with tachypnea and tachycardia  He has a mild cough  Patient states he has no fever  He is unaware of any sick contacts while abroad  Patient arrives in respiratory distress  Tachypneic and hypoxic    ARDS (adult respiratory distress syndrome) ([facility])  Assessment & Plan  Likely d/t Covid (pending)  ARDS net protocol   Continue High PEEP low Fio2, PEEP currently 20, wean gradually  Monitor strict I/o's, avoid volume overload  Suspected Covid-19 Virus Infection  Assessment & Plan  3/30 Covid swab pending  Start Azithromycin and HQ x 5 days  Baseline Qtc 473, continue to monitor daily  Sq heparin tid dvt ppx  Acute Hypoxic respiratory failure ([facility] )  Assessment & Plan  Intubated 3/30 for progressive Hypoxia and increased SOB  Covid swab pending  ETT day 1 size 8, secured at 23 @ lips  Continue mechanical ventilation, current settings 18/400/100/18  ABG daily  JESÚS (acute kidney injury) ([facility] )  Assessment & Plan  Baseline sCr unknown, p/w sCr 4 81  Given 2 5 L bolus in ED, galvez inserted  Trend UOP closely, repeat BMP, trend renal indices  Nephrology consulted, will likely require dialysis  Universal consent obtained from Granddaughter who will be decision maker in family Kassandra Prime)  UTI (urinary tract infection)  Assessment & Plan  UA consistent with infection  Start Rocephin  Follow culture  DM2 (diabetes mellitus, type 2) ([facility] )  Assessment & Plan  No results found for: HGBA1C      Recent Labs     03/30/20  1536   POCGLU 257*         Blood Sugar Average: Last 72 hrs:  (P) 257   Hgb A1c ordered  Start insulin gtt     ED Triage Vitals   Temperature Pulse Respirations Blood Pressure SpO2   03/30/20 1039 03/30/20 1039 03/30/20 1039 03/30/20 1045 03/30/20 1026   98 5 °F (36 9 °C) (!) 126 (!) 40 143/80 98 % Temp Source Heart Rate Source Patient Position - Orthostatic VS BP Location FiO2 (%)   03/30/20 1039 03/30/20 1039 03/30/20 1140 03/30/20 1140 03/30/20 1500   Tympanic Monitor Lying Left arm 100      Pain Score       03/30/20 1039       No Pain        Wt Readings from Last 1 Encounters:   03/31/20 112 kg (245 lb 13 oz)     Additional Vital Signs:   03/30/20 1300    104  22  120/67  88      99 %       03/30/20 1245        120/75  89             03/30/20 1215    104  27Abnormal   135/72  99      91 %       03/30/20 1145    108Abnormal   28Abnormal   118/71  89      89 %Abnormal        03/30/20 1140    110Abnormal   24Abnormal   118/71        87 %Abnormal   Non-rebreather mask  Lying   03/30/20 1119                  Non-rebreather mask     03/30/20 1045    122Abnormal   27Abnormal   143/80  106      90 %       03/30/20 1039  98 5 °F (36 9 °C)  126Abnormal   40Abnormal           70 %Abnormal        03/30/20 1026                98 %       Pertinent Labs/Diagnostic Test Results:   Results from last 7 days   Lab Units 03/31/20  0353 03/30/20  1048   WBC Thousand/uL 16 88* 11 43*   HEMOGLOBIN g/dL 13 0 14 0   HEMATOCRIT % 40 3 43 7   PLATELETS Thousands/uL 154 213   NEUTROS ABS Thousands/µL 13 96*  --    BANDS PCT %  --  7     Results from last 7 days   Lab Units 03/31/20  1019 03/31/20  0956 03/31/20  0353 03/30/20  2154 03/30/20  1636 03/30/20  1048   SODIUM mmol/L 138  --  140 142 136 135*   POTASSIUM mmol/L 4 8  --  4 4 4 1 6 6* 5 9*   CHLORIDE mmol/L 103  --  103 105 105 99*   CO2 mmol/L 26  --  26 25 20* 16*   ANION GAP mmol/L 9  --  11 12 11 20*   BUN mg/dL 39*  --  42* 54* 65* 68*   CREATININE mg/dL 2 58*  --  2 71* 3 20* 4 08* 4 81*   EGFR ml/min/1 73sq m 24  --  23 19 14 11   CALCIUM mg/dL 8 5  --  8 4 7 7* 7 4* 8 8   CALCIUM, IONIZED mmol/L  --  1 09* 1 08* 1 01*  --   --    MAGNESIUM mg/dL 2 0  --  1 9 2 4 1 7  --    PHOSPHORUS mg/dL 4 5*  --  3 8 3 4 3 5 --      Results from last 7 days   Lab Units 03/30/20  1048   AST U/L 80*   ALT U/L 38   ALK PHOS U/L 59   TOTAL PROTEIN g/dL 8 8*   ALBUMIN g/dL 3 2*   TOTAL BILIRUBIN mg/dL 0 40     Results from last 7 days   Lab Units 03/31/20  0820 03/31/20  0558 03/31/20  0357 03/31/20  0149 03/31/20  0010 03/30/20  2156 03/30/20 2012 03/30/20  1810 03/30/20  1536   POC GLUCOSE mg/dl 129 182* 138 182* 172* 119 187* 283* 257*     Results from last 7 days   Lab Units 03/31/20  1019 03/31/20  0353 03/30/20  2154 03/30/20  1636 03/30/20  1048   GLUCOSE RANDOM mg/dL 127 151* 133 271* 270*     Results from last 7 days   Lab Units 03/30/20  1048   HEMOGLOBIN A1C % 7 1*   EAG mg/dl 157      Results from last 7 days   Lab Units 03/31/20  0848 03/31/20  0353 03/30/20  2048   PH ART  7 341* 7 262* 7 295*   PCO2 ART mm Hg 45 8* 55 7* 47 7*   PO2 ART mm Hg 95 1 57 7* 67 7*   HCO3 ART mmol/L 24 2 24 5 22 7   BASE EXC ART mmol/L -1 8 -3 3 -4 0   O2 CONTENT ART mL/dL 17 9 17 7 15 8*   O2 HGB, ARTERIAL % 95 9 86 5* 91 2*   ABG SOURCE  Line, Arterial Line, Arterial Line, Arterial     Results from last 7 days   Lab Units 03/30/20  1048   TROPONIN I ng/mL <0 02     Results from last 7 days   Lab Units 03/31/20  0956 03/31/20  0147 03/30/20  1048   PROTIME seconds  --   --  13 8   INR   --   --  1 02   PTT seconds 46* 41* 32     Results from last 7 days   Lab Units 03/30/20  1636   PROCALCITONIN ng/ml 1 55*     Results from last 7 days   Lab Units 03/30/20  1636 03/30/20  1048   LACTIC ACID mmol/L 0 7 4 9*     Results from last 7 days   Lab Units 03/30/20  1636   LIPASE u/L 323     Results from last 7 days   Lab Units 03/30/20  1423   CLARITY UA  Cloudy   COLOR UA  Yellow   SPEC GRAV UA  1 025   PH UA  5 0   GLUCOSE UA mg/dl Negative   KETONES UA mg/dl Negative   BLOOD UA  Small*   PROTEIN UA mg/dl 30 (1+)*   NITRITE UA  Negative   BILIRUBIN UA  Negative   UROBILINOGEN UA E U /dl 0 2   LEUKOCYTES UA  Negative   WBC UA /hpf 4-10*   RBC UA /hpf 1-2*   BACTERIA UA /hpf Innumerable*   EPITHELIAL CELLS WET PREP /hpf Occasional     Results from last 7 days   Lab Units 03/30/20  1052   INFLUENZA A PCR  None Detected   INFLUENZA B PCR  None Detected   RSV PCR  None Detected     Results from last 7 days   Lab Units 03/30/20  1118 03/30/20  1048   BLOOD CULTURE  Received in Microbiology Lab  Culture in Progress  Received in Microbiology Lab  Culture in Progress  Results from last 7 days   Lab Units 03/30/20  1048   TOTAL COUNTED  100     2019 Novel Coronavirus (COVID-19), collected 3/30/20 @ 1052    3/30  Cxr=No active pulmonary disease on examination which is somewhat limited secondary to low lung volumes  Cxr=1  The tip of the endotracheal tube projects 4 2 cm above the marlo  2   Interval worsening of diffuse bilateral lung opacities  Cxr=ETT, NGT and left IJ line in place  Persistent bilateral airspace disease  Cxr=Bilateral pulmonary vascular congestion and interstitial thickening are again noted  Endotracheal tube is present, in satisfactory position with its tip above the level of the marlo  Enteric tube is present with its tip extending below the left hemidiaphragm  Right and left IJ approach central venous catheters with tips at the   junction of the SVC and right atrium  Throughout the lungs      ED Treatment:   Medication Administration from 03/30/2020 1023 to 03/30/2020 1441       Date/Time Order Dose Route Action     03/30/2020 1143 sodium chloride 0 9 % infusion 1,000 mL/hr Intravenous New Bag     03/30/2020 1205 cefTRIAXone (ROCEPHIN) IVPB (premix) 1,000 mg 1,000 mg Intravenous New Bag     03/30/2020 1215 azithromycin (ZITHROMAX) 500 mg in sodium chloride 0 9% 250mL IVPB 500 mg 500 mg Intravenous New Bag     03/30/2020 1202 calcium gluconate 1 g in sodium chloride 0 9% 50 mL (premix) 1 g Intravenous New Bag     03/30/2020 1201 insulin regular (HumuLIN R,NovoLIN R) injection 10 Units 10 Units Intravenous Given     03/30/2020 1201 dextrose 50 % IV solution 13 mL 13 mL Intravenous Given     03/30/2020 1201 sodium polystyrene (KAYEXALATE) powder 15 g 15 g Oral Given     03/30/2020 1324 etomidate (AMIDATE) 2 mg/mL injection 20 mg 20 mg Intravenous Given     03/30/2020 1302 Succinylcholine Chloride 100 mg/5 mL syringe 100 mg 100 mg Intravenous Given     03/30/2020 1303 propofol (DIPRIVAN) 1000 mg in 100 mL infusion (premix) 7 937 mcg/kg/min Intravenous New Bag     03/30/2020 1240 midazolam (VERSED) injection 1 mg 1 mg Intravenous Given     03/30/2020 1422 midazolam (VERSED) injection 2 mg 2 mg Intravenous Given        Past Medical History:   Diagnosis Date    Diabetes mellitus ([facility] )     Hypertension      Present on Admission:   Suspected Covid-19 Virus Infection   [facility]S [facility] )   ARDS (adult respiratory distress syndrome) ([facility])   Hyperkalemia   (Resolved) Lactic acidosis   DM2 (diabetes mellitus, type 2) ([facility])   Hyperglycemia   Metabolic acidosis   Acute Hypoxic respiratory failure ([facility])  Admitting Diagnosis: Diarrhea [R19 7]  Hyperkalemia [E87 5]  Vomiting [R11 10]  Pneumonia [J18 9]  Renal failure [N19]  SOB (shortness of breath) [R06 02]  Lethargic [R53 83]  Hypoxia [R09 02]  Age/Sex: 78 y o  male  Admission Orders:  Vented  Consult renal  accuchecks per iv insulin gtt protocol  Fall precautions  Neuro checks q4h  Scd/foot pumps  A-line care & monitoring  Labs per iv heparin gtt protocol  Airborne isolation  Scheduled Medications:  azithromycin 500 mg Oral Q24H   cefTRIAXone 1,000 mg Intravenous Q24H   chlorhexidine 15 mL Swish & Spit Q12H [facility] & half-way   hydroxychloroquine 200 mg Oral TID With Meals   influenza vaccine 0 5 mL Intramuscular Once   midazolam 4 mg Intravenous Once     Continuous IV Infusions:  heparin (porcine) 600 Units/hr Intravenous Continuous   HYDROmorphone 0 5 mg/hr Intravenous Continuous   insulin regular (HumuLIN R,NovoLIN R) infusion 0 3-21 Units/hr Intravenous Titrated   norepinephrine 1-30 mcg/min Intravenous Titrated   NxStage K 4/Ca 3 20,000 mL Dialysis Continuous   propofol 5-50 mcg/kg/min Intravenous Titrated     PRN Meds:  fentanyl citrate (PF) 50 mcg Intravenous Q1H PRN   midazolam 1 mg Intravenous Q2H PRN   pneumococcal 13-valent conjugate vaccine 0 5 mL Intramuscular Prior to discharge     Network Utilization Review Department  [email or web address]  ATTENTION: Please call with any questions or concerns to [phone] and carefully listen to the prompts so that you are directed to the right person  All voicemails are confidential   Jennifer Chaparro all requests for admission clinical reviews, approved or denied determinations and any other requests to dedicated fax number below belonging to the campus where the patient is receiving treatment   List of dedicated fax numbers for the Facilities:  [address] DENIALS (Administrative/Medical Necessity) [phone]   [address] (Maternity/NICU/Pediatrics) [phone]   Aura Beltrán [phone]   Gino Eastman [phone]   Eulalio Serrano [phone]   Haley Damon [phone]   [address] [phone]   [facility]  [phone]   2205 [facility]  [address] [facility] [address] [facility] [phone]

## 2020-03-31 NOTE — PLAN OF CARE
Problem: Potential for Falls  Goal: Patient will remain free of falls  Description  INTERVENTIONS:  - Assess patient frequently for physical needs  -  Identify cognitive and physical deficits and behaviors that affect risk of falls    -  [place] fall precautions as indicated by assessment   - Educate patient/family on patient safety including physical limitations  - Instruct patient to call for assistance with activity based on assessment  - Modify environment to reduce risk of injury  - Consider OT/PT consult to assist with strengthening/mobility  Outcome: Progressing     Problem: RESPIRATORY - ADULT  Goal: Achieves optimal ventilation and oxygenation  Description  INTERVENTIONS:  - Assess for changes in respiratory status  - Assess for changes in mentation and behavior  - Position to facilitate oxygenation and minimize respiratory effort  - Oxygen administered by appropriate delivery if ordered  - Initiate smoking cessation education as indicated  - Encourage broncho-pulmonary hygiene including cough, deep breathe, Incentive Spirometry  - Assess the need for suctioning and aspirate as needed  - Assess and instruct to report SOB or any respiratory difficulty  - Respiratory Therapy support as indicated  - Ventilator   Outcome: Progressing     Problem: GASTROINTESTINAL - ADULT  Goal: Minimal or absence of nausea and/or vomiting  Description  INTERVENTIONS:  - Administer IV fluids if ordered to ensure adequate hydration  - Maintain NPO status until nausea and vomiting are resolved  - Nasogastric tube if ordered  - Administer ordered antiemetic medications as needed  - Provide nonpharmacologic comfort measures as appropriate  - Advance diet as tolerated, if ordered  - Consider nutrition services referral to assist patient with adequate nutrition and appropriate food choices  Outcome: Progressing  Goal: Maintains or returns to baseline bowel function  Description  INTERVENTIONS:  - Assess bowel function  - Encourage oral fluids to ensure adequate hydration  - Administer IV fluids if ordered to ensure adequate hydration  - Administer ordered medications as needed  - Encourage mobilization and activity  - Consider nutritional services referral to assist patient with adequate nutrition and appropriate food choices  Outcome: Progressing  Goal: Maintains adequate nutritional intake  Description  INTERVENTIONS:  - Monitor percentage of each meal consumed  - Identify factors contributing to decreased intake, treat as appropriate  - Assist with meals as needed  - Monitor I&O, weight, and lab values if indicated  - Obtain nutrition services referral as needed  Outcome: Progressing  Goal: Establish and maintain optimal ostomy function  Description  INTERVENTIONS:  - Assess bowel function  - Encourage oral fluids to ensure adequate hydration  - Administer IV fluids if ordered to ensure adequate hydration   - Administer ordered medications as needed  - Encourage mobilization and activity  - Nutrition services referral to assist patient with appropriate food choices  - Assess stoma site  - Consider wound care consult   Outcome: Progressing     Problem: METABOLIC, FLUID AND ELECTROLYTES - ADULT  Goal: Electrolytes maintained within normal limits  Description  INTERVENTIONS:  - Monitor labs and assess patient for signs and symptoms of electrolyte imbalances  - Administer electrolyte replacement as ordered  - Monitor response to electrolyte replacements, including repeat lab results as appropriate  - Instruct patient on fluid and nutrition as appropriate  Outcome: Progressing  Goal: Fluid balance maintained  Description  INTERVENTIONS:  - Monitor labs   - Monitor I/O and WT  - Instruct patient on fluid and nutrition as appropriate  - Assess for signs & symptoms of volume excess or deficit  Outcome: Progressing  Goal: Glucose maintained within target range  Description  INTERVENTIONS:  - Monitor Blood Glucose as ordered  - Assess for signs and symptoms of hyperglycemia and hypoglycemia  - Administer ordered medications to maintain glucose within target range  - Assess nutritional intake and initiate nutrition service referral as needed  Outcome: Progressing     Problem: Prexisting or High Potential for Compromised Skin Integrity  Goal: Skin integrity is maintained or improved  Description  INTERVENTIONS:  - Identify patients at risk for skin breakdown  - Assess and monitor skin integrity  - Assess and monitor nutrition and hydration status  - Monitor labs   - Assess for incontinence   - Turn and reposition patient  - Assist with mobility/ambulation  - Relieve pressure over bony prominences  - Avoid friction and shearing  - Provide appropriate hygiene as needed including keeping skin clean and dry  - Evaluate need for skin moisturizer/barrier cream  - Collaborate with interdisciplinary team   - Patient/family teaching  - Consider wound care consult   Outcome: Progressing

## 2020-03-31 NOTE — PROGRESS NOTES
3/30/2020 -     Time: 6053-1798  Pt hypotensive  Review of last abg shows pH of 7 1 with a mixed metabolic and respiratory acidosis  Suspect vasoplegia 2/2 severe acidemia with out respiratory compensation  2 amps bicarb given with improvement  Ventilator changes made to increase minute ventilation  Pt responded appropriately and BP stabilized  Time 2240- 2300: Pt with HR of 183  EKG appears to be SVT vs Aflutter with 1:1   5mg metoprolol administered  Pt broke to NSR  Hemodynamics stable     3/31/2020:    Time 8294-4378    Pt noted to be tachycardic with HR in 140s  Examined patient at bedside  BIS at 75  Discussed sedation with nursing  Increased propofol and added low dose dilaudid gtt  Bis to 50, NSR  No further tachycardia       Critical care time: 60 minutes

## 2020-03-31 NOTE — ASSESSMENT & PLAN NOTE
Baseline sCr unknown, p/w sCr 4 81  Bicarb infusion  CRRT started  Improvement in electrolytes  Nephrology following  [place] consent obtained from Granddaughter who will be decision maker in family Alireza Pitts)

## 2020-03-31 NOTE — PROGRESS NOTES
Progress Note Malena Ar 1950, 78 y o  male MRN: [id number]    Unit/Bed#: ICU 01 Encounter: [id number]    Primary Care Provider: No primary care provider on file  Date and time admitted to hospital: 3/30/2020 10:37 AM        * ARDS (adult respiratory distress syndrome) ([facility] )  Assessment & Plan  · Likely d/t Covid (pending)  · ARDSnet protocol   · Currently still hypoxic on settings  Attempt APRV/Bilevel and reassess  Consider proning although would be difficult  · Discontinue NMB for trial of APRV  Restart if fails    · Monitor strict I/o's, avoid volume overload    Suspected Covid-19 Virus Infection  Assessment & Plan  3/30 Covid swab pending  Continue Plaquenil/Azithromycin and HQ x 5 days  Baseline Qtc 473, continue to monitor daily  Sq heparin tid dvt ppx    Acute Hypoxic respiratory failure ([place] [id number] )  Assessment & Plan  Intubated 3/30 for progressive Hypoxia and increased SOB  Covid swab pending  ETT day 2 size 8, secured at 23 @ lips  Continue mechanical ventilation, attempt APRV Phigh 30 PLow 0 Tlow 1 and re-assess  ABG daily    JESÚS (acute kidney injury) ([place] [id number] )  Assessment & Plan  Baseline sCr unknown, p/w sCr 4 81  Bicarb infusion  CRRT started  Improvement in electrolytes  Nephrology following  [place] consent obtained from Granddaughter who will be decision maker in family Criss Ruby)    UTI (urinary tract infection)  Assessment & Plan  UA consistent with infection  Start Rocephin  Follow culture      DM2 (diabetes mellitus, type 2) ([place] [id number] )  Assessment & Plan  Lab Results   Component Value Date    HGBA1C 7 1 (H) 03/30/2020       Recent Labs     03/31/20  0010 03/31/20  0149 03/31/20  0357 03/31/20  0558   POCGLU 172* 182* 138 182*       Blood Sugar Average: Last 72 hrs:  (P) 190     Hgb A1c ordered  Start insulin gtt     Hyperkalemia  Assessment & Plan  Resolved with CRRT        ----------------------------------------------------------------------------------------  HPI/24hr events: worsening hypoxia overnight    Disposition: Continue Critical Care   Code Status: Level 1 - Full Code  ---------------------------------------------------------------------------------------  SUBJECTIVE  intubated    Review of Systems  Review of systems was unable to be performed secondary to intubated  ---------------------------------------------------------------------------------------  OBJECTIVE    Vitals   Vitals:    20 0700 20 0730 20 0732 20 1010   BP: (!) 87/62 92/71     Pulse: 95 95     Resp: (!) 30 (!) 30     Temp: (!) 96 8 °F (36 °C) (!) 96 8 °F (36 °C)     TempSrc:       SpO2: 95% 96% 96% 91%   Weight:       Height:         Temp (24hrs), Av 7 °F (36 5 °C), Min:96 8 °F (36 °C), Max:99 °F (37 2 °C)  Current: Temperature: (!) 96 8 °F (36 °C)  Arterial Line BP: 99/68  Arterial Line MAP (mmHg): 78 mmHg  SpO2: SpO2: 91 %, SpO2 Activity: SpO2 Activity: At Rest, SpO2 Device: O2 Device: Ventilator  O2 Flow Rate (L/min): 10 L/min    Physical Exam   Constitutional: He appears well-developed and well-nourished  No distress  HENT:   Head: Normocephalic and atraumatic  Eyes: Pupils are equal, round, and reactive to light  Neck: Normal range of motion  Neck supple  Cardiovascular: Normal rate and regular rhythm  No murmur heard  Pulmonary/Chest: No respiratory distress  He has no wheezes  Abdominal: Soft  Bowel sounds are normal  He exhibits no distension  Musculoskeletal: He exhibits no edema  Neurological:   Sedated, paralysis   Skin: Skin is warm and dry  Nursing note and vitals reviewed        Invasive/non-invasive ventilation settings   Respiratory    Lab Data (Last 4 hours)       0848            pH, Arterial       7 341           pCO2, Arterial       45 8           pO2, Arterial       95 1           HCO3, Arterial       24 2           Base Excess, Arterial       -1 8                O2/Vent Data        0732   Most Recent         Vent Mode AC/VC  Bilevel      Resp Rate (BPM) (BPM) 30  30      Vt (mL) (mL) 500  500      FIO2 (%) (%) 100  100      PEEP (cmH2O) (cmH2O) 18  18      T Low (S) (sec)   1      Patient safety screen outcome: Failed  Failed      MV 16 6  16 6      Resp Rate (BPM) (BPM)   22      P High (cmH2O) (cm)   30      P Low (cmH2O) (cm)   0      FIO2 (%) (%)   100      MV (Obs)   20 5                  Laboratory and Diagnostics:  Results from last 7 days   Lab Units 03/31/20  0353 03/30/20  1048   WBC Thousand/uL 16 88* 11 43*   HEMOGLOBIN g/dL 13 0 14 0   HEMATOCRIT % 40 3 43 7   PLATELETS Thousands/uL 154 213   NEUTROS PCT % 83*  --    BANDS PCT %  --  7   MONOS PCT % 5  --    MONO PCT %  --  10     Results from last 7 days   Lab Units 03/31/20  1019 03/31/20  0353 03/30/20  2154 03/30/20  1636 03/30/20  1048   SODIUM mmol/L 138 140 142 136 135*   POTASSIUM mmol/L 4 8 4 4 4 1 6 6* 5 9*   CHLORIDE mmol/L 103 103 105 105 99*   CO2 mmol/L 26 26 25 20* 16*   ANION GAP mmol/L 9 11 12 11 20*   BUN mg/dL 39* 42* 54* 65* 68*   CREATININE mg/dL 2 58* 2 71* 3 20* 4 08* 4 81*   CALCIUM mg/dL 8 5 8 4 7 7* 7 4* 8 8   GLUCOSE RANDOM mg/dL 127 151* 133 271* 270*   ALT U/L  --   --   --   --  38   AST U/L  --   --   --   --  80*   ALK PHOS U/L  --   --   --   --  59   ALBUMIN g/dL  --   --   --   --  3 2*   TOTAL BILIRUBIN mg/dL  --   --   --   --  0 40     Results from last 7 days   Lab Units 03/31/20  1019 03/31/20  0353 03/30/20  2154 03/30/20  1636   MAGNESIUM mg/dL 2 0 1 9 2 4 1 7   PHOSPHORUS mg/dL 4 5* 3 8 3 4 3 5      Results from last 7 days   Lab Units 03/31/20  0956 03/31/20  0147 03/30/20  1048   INR   --   --  1 02   PTT seconds 46* 41* 32      Results from last 7 days   Lab Units 03/30/20  1048   TROPONIN I ng/mL <0 02     Results from last 7 days   Lab Units 03/30/20  1636 03/30/20  1048   LACTIC ACID mmol/L 0 7 4 9*     ABG:  Results from last 7 days   Lab Units 03/31/20  0848   PH ART  7 341*   PCO2 ART mm Hg 45 8*   PO2 ART mm Hg 95 1   HCO3 ART mmol/L 24 2 BASE EXC ART mmol/L -1 8   ABG SOURCE  Line, Arterial     VBG:  Results from last 7 days   Lab Units 03/31/20  0848   ABG SOURCE  Line, Arterial     Results from last 7 days   Lab Units 03/30/20  1636   PROCALCITONIN ng/ml 1 55*       Micro  Results from last 7 days   Lab Units 03/30/20  1118 03/30/20  1048   BLOOD CULTURE  Received in Microbiology Lab  Culture in Progress  Received in Microbiology Lab  Culture in Progress  EKG:   Imaging: I have personally reviewed pertinent reports  Intake and Output  I/O       03/29 0701 - 03/30 0700 03/30 0701 - 03/31 0700 03/31 0701 - 04/01 0700    I V  (mL/kg)  4783 9 (43 1)     IV Piggyback  350     Total Intake(mL/kg)  5133 9 (46 3)     Urine (mL/kg/hr)  930     Emesis/NG output  0     Other  1757     Total Output  2687     Net  +2446 9                  Height and Weights   Height: 5' 10" (177 8 cm)  IBW: 73 kg  Body mass index is 35 27 kg/m²  Weight (last 2 days)     Date/Time   Weight    03/31/20 0552   112 (245 81)    03/30/20 1600   109 (240 3)    03/30/20 1220   105 (232 37)    03/30/20 1039   108 (238 98)    03/30/20 1026   97 1 (214)                Nutrition       Diet Orders   (From admission, onward)             Start     Ordered    03/31/20 0856  Room Service  Once     Question:  Type of Service  Answer:  Room Service- Not Appropriate    03/31/20 0856    03/30/20 1453  Diet NPO  Diet effective now     Question Answer Comment   Diet Type NPO    RD to adjust diet per protocol?  Yes        03/30/20 1452                  Active Medications  Scheduled Meds:  Current Facility-Administered Medications:  azithromycin 500 mg Oral Q24H Spencer, PA-C    cefTRIAXone 1,000 mg Intravenous Q24H Spencer, PA-C    chlorhexidine 15 mL Swish & Spit Q12H [address] Spencer, PA-C    fentanyl citrate (PF) 50 mcg Intravenous Q1H PRN Spencer PA-C    heparin (porcine) 600 Units/hr Intravenous Continuous GAYE Palmer Last Rate: 600 Units/hr (03/31/20 0345)   HYDROmorphone 0 5 mg/hr Intravenous Continuous Deysi Esparza PA-C Last Rate: Stopped (03/31/20 0551)   hydroxychloroquine 200 mg Oral TID With Meals Leighton CourserGAYE    influenza vaccine 0 5 mL Intramuscular Once Vicky Warner MD    insulin regular (HumuLIN R,NovoLIN R) infusion 0 3-21 Units/hr Intravenous Titrated Rebekasoila Cain PA-C Last Rate: 2 Units/hr (03/31/20 0824)   midazolam 1 mg Intravenous Q2H PRN Deysi Esparza PA-C    midazolam 4 mg Intravenous Once Leighton CourserGAYE    norepinephrine 1-30 mcg/min Intravenous Titrated Deysi Esparza PA-C Last Rate: Stopped (03/31/20 0402)   NxStage K 4/Ca 3 20,000 mL Dialysis Continuous Michael Castellanos MD    pneumococcal 13-valent conjugate vaccine 0 5 mL Intramuscular Prior to discharge Vicky Warner MD    propofol 5-50 mcg/kg/min Intravenous Titrated Gary Mackey MD Last Rate: 20 mcg/kg/min (03/31/20 2052)     Continuous Infusions:    heparin (porcine) 600 Units/hr Last Rate: 600 Units/hr (03/31/20 0345)   HYDROmorphone 0 5 mg/hr Last Rate: Stopped (03/31/20 0551)   insulin regular (HumuLIN R,NovoLIN R) infusion 0 3-21 Units/hr Last Rate: 2 Units/hr (03/31/20 0824)   norepinephrine 1-30 mcg/min Last Rate: Stopped (03/31/20 0402)   NxStage K 4/Ca 3 20,000 mL    propofol 5-50 mcg/kg/min Last Rate: 20 mcg/kg/min (03/31/20 0822)     PRN Meds:     fentanyl citrate (PF) 50 mcg Q1H PRN   midazolam 1 mg Q2H PRN   pneumococcal 13-valent conjugate vaccine 0 5 mL Prior to discharge       Invasive Devices Review  Invasive Devices     Central Venous Catheter Line            CVC Central Lines 03/30/20 Triple 20cm less than 1 day          Peripheral Intravenous Line            Peripheral IV 03/30/20 Right Antecubital 1 day    Peripheral IV 03/30/20 Right Wrist less than 1 day          Arterial Line            Arterial Line 03/30/20 Radial less than 1 day          Hemodialysis Catheter            HD Temporary Double Catheter less than 1 day Drain            NG/OG/Enteral Tube Orogastric Right mouth less than 1 day    Urethral Catheter Non-latex 16 Fr  less than 1 day          Airway            ETT  8 mm less than 1 day                Rationale for remaining devices: critically ill  ---------------------------------------------------------------------------------------  Advance Directive and Living Will:      Power of :    POLST:    ---------------------------------------------------------------------------------------  Care Time Delivered:   Upon my evaluation, this patient had a high probability of imminent or life-threatening deterioration due to hypoxic respiratory failure, shock, which required my direct attention, intervention, and personal management  I have personally provided 60 minutes (0600 to 0700) of critical care time, exclusive of procedures, teaching, family meetings, and any prior time recorded by providers other than myself  Kaye Slater PA-C      Portions of the record may have been created with voice recognition software  Occasional wrong word or "sound a like" substitutions may have occurred due to the inherent limitations of voice recognition software    Read the chart carefully and recognize, using context, where substitutions have occurred

## 2020-03-31 NOTE — ASSESSMENT & PLAN NOTE
· Likely d/t Covid (pending)  · ARDSnet protocol   · Currently still hypoxic on settings  Attempt APRV/Bilevel and reassess  Consider proning although would be difficult  · Discontinue NMB for trial of APRV  Restart if fails    · Monitor strict I/o's, avoid volume overload

## 2020-03-31 NOTE — PROGRESS NOTES
Recommend Jevity 1 2 at goal of 50 mL/hour for a total volume of 1200 mL  This will provide 1440 kcals (1773 kcals with current propofol), 67 grams protein and 968 mL free water  Recommend 1 packet of prosource daily for a total kcal intake of 1833 and protein of 82 grams  Recommend flushes of 120 mL q 4 hours for total fluid intake of 1688 mL

## 2020-03-31 NOTE — ASSESSMENT & PLAN NOTE
Intubated 3/30 for progressive Hypoxia and increased SOB  Covid swab pending  ETT day 2 size 8, secured at 23 @ lips  Continue mechanical ventilation, attempt APRV Phigh 30 PLow 0 Tlow 1 and re-assess  ABG daily

## 2020-04-01 NOTE — ASSESSMENT & PLAN NOTE
· 3/30 Covid swab pending  · Continue Plaquenil/Azithromycin and HQ x 5 days  · Baseline Qtc 473, continue to monitor daily  · Heparin infusion secondary to CVVH

## 2020-04-01 NOTE — PLAN OF CARE
Problem: Potential for Falls  Goal: Patient will remain free of falls  Description  INTERVENTIONS:  - Assess patient frequently for physical needs  -  Identify cognitive and physical deficits and behaviors that affect risk of falls    -  [place] fall precautions as indicated by assessment   - Educate patient/family on patient safety including physical limitations  - Instruct patient to call for assistance with activity based on assessment  - Modify environment to reduce risk of injury  - Consider OT/PT consult to assist with strengthening/mobility  3/31/2020 2003 by Lora Sanders RN  Outcome: Progressing  3/31/2020 2002 by Lora Sanders RN  Outcome: Progressing  3/31/2020 2001 by Lora Sanders RN  Outcome: Progressing     Problem: RESPIRATORY - ADULT  Goal: Achieves optimal ventilation and oxygenation  Description  INTERVENTIONS:  - Assess for changes in respiratory status  - Assess for changes in mentation and behavior  - Position to facilitate oxygenation and minimize respiratory effort  - Oxygen administered by appropriate delivery if ordered  - Initiate smoking cessation education as indicated  - Encourage broncho-pulmonary hygiene including cough, deep breathe, Incentive Spirometry  - Assess the need for suctioning and aspirate as needed  - Assess and instruct to report SOB or any respiratory difficulty  - Respiratory Therapy support as indicated  - Ventilator   3/31/2020 2003 by Lora Sanders RN  Outcome: Progressing  3/31/2020 2002 by Lora Sanders RN  Outcome: Progressing  3/31/2020 2001 by Lora Sanders RN  Outcome: Progressing     Problem: GASTROINTESTINAL - ADULT  Goal: Minimal or absence of nausea and/or vomiting  Description  INTERVENTIONS:  - Administer IV fluids if ordered to ensure adequate hydration  - Maintain NPO status until nausea and vomiting are resolved  - Nasogastric tube if ordered  - Administer ordered antiemetic medications as needed  - Provide nonpharmacologic comfort measures as appropriate  - Advance diet as tolerated, if ordered  - Consider nutrition services referral to assist patient with adequate nutrition and appropriate food choices  3/31/2020 2003 by Damien Gross RN  Outcome: Progressing  3/31/2020 2002 by Damien Gross RN  Outcome: Progressing  3/31/2020 2001 by Damien Gross RN  Outcome: Progressing  Goal: Maintains or returns to baseline bowel function  Description  INTERVENTIONS:  - Assess bowel function  - Encourage oral fluids to ensure adequate hydration  - Administer IV fluids if ordered to ensure adequate hydration  - Administer ordered medications as needed  - Encourage mobilization and activity  - Consider nutritional services referral to assist patient with adequate nutrition and appropriate food choices  3/31/2020 2003 by Damien Gross RN  Outcome: Progressing  3/31/2020 2002 by Damien Gross RN  Outcome: Progressing  3/31/2020 2001 by Damien Gross RN  Outcome: Progressing  Goal: Maintains adequate nutritional intake  Description  INTERVENTIONS:  - Monitor percentage of each meal consumed  - Identify factors contributing to decreased intake, treat as appropriate  - Assist with meals as needed  - Monitor I&O, weight, and lab values if indicated  - Obtain nutrition services referral as needed  3/31/2020 2003 by Damien Gross RN  Outcome: Progressing  3/31/2020 2002 by Damien Gross RN  Outcome: Progressing  3/31/2020 2001 by Damien Gross RN  Outcome: Progressing  Goal: Establish and maintain optimal ostomy function  Description  INTERVENTIONS:  - Assess bowel function  - Encourage oral fluids to ensure adequate hydration  - Administer IV fluids if ordered to ensure adequate hydration   - Administer ordered medications as needed  - Encourage mobilization and activity  - Nutrition services referral to assist patient with appropriate food choices  - Assess stoma site  - Consider wound care consult   3/31/2020 2003 by Nicol Garcia Mercedes Cody RN  Outcome: Progressing  3/31/2020 2002 by Herby Landau, RN  Outcome: Progressing  3/31/2020 2001 by Herby Landau, RN  Outcome: Progressing     Problem: METABOLIC, FLUID AND ELECTROLYTES - ADULT  Goal: Electrolytes maintained within normal limits  Description  INTERVENTIONS:  - Monitor labs and assess patient for signs and symptoms of electrolyte imbalances  - Administer electrolyte replacement as ordered  - Monitor response to electrolyte replacements, including repeat lab results as appropriate  - Instruct patient on fluid and nutrition as appropriate  3/31/2020 2003 by Herby Landau, RN  Outcome: Progressing  3/31/2020 2002 by Herby Landau, RN  Outcome: Progressing  3/31/2020 2001 by Herby Landau, RN  Outcome: Progressing  Goal: Fluid balance maintained  Description  INTERVENTIONS:  - Monitor labs   - Monitor I/O and WT  - Instruct patient on fluid and nutrition as appropriate  - Assess for signs & symptoms of volume excess or deficit  3/31/2020 2003 by Herby Landau, RN  Outcome: Progressing  3/31/2020 2002 by Herby Landau, RN  Outcome: Progressing  3/31/2020 2001 by Herby Landau, RN  Outcome: Progressing  Goal: Glucose maintained within target range  Description  INTERVENTIONS:  - Monitor Blood Glucose as ordered  - Assess for signs and symptoms of hyperglycemia and hypoglycemia  - Administer ordered medications to maintain glucose within target range  - Assess nutritional intake and initiate nutrition service referral as needed  3/31/2020 2003 by Herby Landau, RN  Outcome: Progressing  3/31/2020 2002 by Herby Landau, RN  Outcome: Progressing  3/31/2020 2001 by Herby Landau, RN  Outcome: Progressing     Problem: Prexisting or High Potential for Compromised Skin Integrity  Goal: Skin integrity is maintained or improved  Description  INTERVENTIONS:  - Identify patients at risk for skin breakdown  - Assess and monitor skin integrity  - Assess and monitor nutrition and hydration status  - Monitor labs   - Assess for incontinence   - Turn and reposition patient  - Assist with mobility/ambulation  - Relieve pressure over bony prominences  - Avoid friction and shearing  - Provide appropriate hygiene as needed including keeping skin clean and dry  - Evaluate need for skin moisturizer/barrier cream  - Collaborate with interdisciplinary team   - Patient/family teaching  - Consider wound care consult   3/31/2020 2003 by Anil Payne RN  Outcome: Progressing  3/31/2020 2002 by Anil Payne RN  Outcome: Progressing  3/31/2020 2001 by Anil Payne RN  Outcome: Progressing     Problem: Nutrition/Hydration-ADULT  Goal: Nutrient/Hydration intake appropriate for improving, restoring or maintaining nutritional needs  Description  Monitor and assess patient's nutrition/hydration status for malnutrition  Collaborate with interdisciplinary team and initiate plan and interventions as ordered  Monitor patient's weight and dietary intake as ordered or per policy  Utilize nutrition screening tool and intervene as necessary  Determine patient's food preferences and provide high-protein, high-caloric foods as appropriate       INTERVENTIONS:  - Monitor oral intake, urinary output, labs, and treatment plans  - Assess nutrition and hydration status and recommend course of action  - Evaluate amount of meals eaten  - Assist patient with eating if necessary   - Allow adequate time for meals  - Recommend/ encourage appropriate diets, oral nutritional supplements, and vitamin/mineral supplements  - Order, calculate, and assess calorie counts as needed  - Recommend, monitor, and adjust tube feedings and TPN/PPN based on assessed needs  - Assess need for intravenous fluids  - Provide specific nutrition/hydration education as appropriate  - Include patient/family/caregiver in decisions related to nutrition  3/31/2020 2003 by Anil Payne RN  Outcome: Progressing  3/31/2020 2002 by Anil Payne RN  Outcome: Progressing  3/31/2020 2001 by Deion Last RN  Outcome: Progressing     Problem: SAFETY,RESTRAINT: NV/NON-SELF DESTRUCTIVE BEHAVIOR  Goal: Remains free of harm/injury (restraint for non violent/non self-detsructive behavior)  Description  INTERVENTIONS:  - Instruct patient/family regarding restraint use   - Assess and monitor physiologic and psychological status   - Provide interventions and comfort measures to meet assessed patient needs   - Identify and implement measures to help patient regain control  - Assess readiness for release of restraint   3/31/2020 2003 by Deion Last RN  Outcome: Progressing  3/31/2020 2002 by Deion Last RN  Outcome: Progressing  Goal: Returns to optimal restraint-free functioning  Description  INTERVENTIONS:  - Assess the patient's behavior and symptoms that indicate continued need for restraint  - Identify and implement measures to help patient regain control  - Assess readiness for release of restraint   3/31/2020 2003 by Deion Last RN  Outcome: Progressing  3/31/2020 2002 by Deion Last RN  Outcome: Progressing   Care plan ongoing

## 2020-04-01 NOTE — PLAN OF CARE
Problem: Potential for Falls  Goal: Patient will remain free of falls  Description  INTERVENTIONS:  - Assess patient frequently for physical needs  -  Identify cognitive and physical deficits and behaviors that affect risk of falls    -  Carey fall precautions as indicated by assessment   - Educate patient/family on patient safety including physical limitations  - Instruct patient to call for assistance with activity based on assessment  - Modify environment to reduce risk of injury  - Consider OT/PT consult to assist with strengthening/mobility  Outcome: Progressing     Problem: RESPIRATORY - ADULT  Goal: Achieves optimal ventilation and oxygenation  Description  INTERVENTIONS:  - Assess for changes in respiratory status  - Assess for changes in mentation and behavior  - Position to facilitate oxygenation and minimize respiratory effort  - Oxygen administered by appropriate delivery if ordered  - Initiate smoking cessation education as indicated  - Encourage broncho-pulmonary hygiene including cough, deep breathe, Incentive Spirometry  - Assess the need for suctioning and aspirate as needed  - Assess and instruct to report SOB or any respiratory difficulty  - Respiratory Therapy support as indicated  - Ventilator   Outcome: Progressing     Problem: GASTROINTESTINAL - ADULT  Goal: Minimal or absence of nausea and/or vomiting  Description  INTERVENTIONS:  - Administer IV fluids if ordered to ensure adequate hydration  - Maintain NPO status until nausea and vomiting are resolved  - Nasogastric tube if ordered  - Administer ordered antiemetic medications as needed  - Provide nonpharmacologic comfort measures as appropriate  - Advance diet as tolerated, if ordered  - Consider nutrition services referral to assist patient with adequate nutrition and appropriate food choices  Outcome: Progressing  Goal: Maintains or returns to baseline bowel function  Description  INTERVENTIONS:  - Assess bowel function  - Encourage oral fluids to ensure adequate hydration  - Administer IV fluids if ordered to ensure adequate hydration  - Administer ordered medications as needed  - Encourage mobilization and activity  - Consider nutritional services referral to assist patient with adequate nutrition and appropriate food choices  Outcome: Progressing  Goal: Maintains adequate nutritional intake  Description  INTERVENTIONS:  - Monitor percentage of each meal consumed  - Identify factors contributing to decreased intake, treat as appropriate  - Assist with meals as needed  - Monitor I&O, weight, and lab values if indicated  - Obtain nutrition services referral as needed  Outcome: Progressing  Goal: Establish and maintain optimal ostomy function  Description  INTERVENTIONS:  - Assess bowel function  - Encourage oral fluids to ensure adequate hydration  - Administer IV fluids if ordered to ensure adequate hydration   - Administer ordered medications as needed  - Encourage mobilization and activity  - Nutrition services referral to assist patient with appropriate food choices  - Assess stoma site  - Consider wound care consult   Outcome: Progressing     Problem: METABOLIC, FLUID AND ELECTROLYTES - ADULT  Goal: Electrolytes maintained within normal limits  Description  INTERVENTIONS:  - Monitor labs and assess patient for signs and symptoms of electrolyte imbalances  - Administer electrolyte replacement as ordered  - Monitor response to electrolyte replacements, including repeat lab results as appropriate  - Instruct patient on fluid and nutrition as appropriate  Outcome: Progressing  Goal: Fluid balance maintained  Description  INTERVENTIONS:  - Monitor labs   - Monitor I/O and WT  - Instruct patient on fluid and nutrition as appropriate  - Assess for signs & symptoms of volume excess or deficit  Outcome: Progressing  Goal: Glucose maintained within target range  Description  INTERVENTIONS:  - Monitor Blood Glucose as ordered  - Assess for signs and symptoms of hyperglycemia and hypoglycemia  - Administer ordered medications to maintain glucose within target range  - Assess nutritional intake and initiate nutrition service referral as needed  Outcome: Progressing     Problem: Prexisting or High Potential for Compromised Skin Integrity  Goal: Skin integrity is maintained or improved  Description  INTERVENTIONS:  - Identify patients at risk for skin breakdown  - Assess and monitor skin integrity  - Assess and monitor nutrition and hydration status  - Monitor labs   - Assess for incontinence   - Turn and reposition patient  - Assist with mobility/ambulation  - Relieve pressure over bony prominences  - Avoid friction and shearing  - Provide appropriate hygiene as needed including keeping skin clean and dry  - Evaluate need for skin moisturizer/barrier cream  - Collaborate with interdisciplinary team   - Patient/family teaching  - Consider wound care consult   Outcome: Progressing     Problem: Nutrition/Hydration-ADULT  Goal: Nutrient/Hydration intake appropriate for improving, restoring or maintaining nutritional needs  Description  Monitor and assess patient's nutrition/hydration status for malnutrition  Collaborate with interdisciplinary team and initiate plan and interventions as ordered  Monitor patient's weight and dietary intake as ordered or per policy  Utilize nutrition screening tool and intervene as necessary  Determine patient's food preferences and provide high-protein, high-caloric foods as appropriate       INTERVENTIONS:  - Monitor oral intake, urinary output, labs, and treatment plans  - Assess nutrition and hydration status and recommend course of action  - Evaluate amount of meals eaten  - Assist patient with eating if necessary   - Allow adequate time for meals  - Recommend/ encourage appropriate diets, oral nutritional supplements, and vitamin/mineral supplements  - Order, calculate, and assess calorie counts as needed  - Recommend, monitor, and adjust tube feedings and TPN/PPN based on assessed needs  - Assess need for intravenous fluids  - Provide specific nutrition/hydration education as appropriate  - Include patient/family/caregiver in decisions related to nutrition  Outcome: Progressing   Care -plan ongoing

## 2020-04-01 NOTE — DISCHARGE SUMMARY
Discharge Summary Mila Jewett 78 y o  male MRN: [id number]    Unit/Bed#: ICU 01 Encounter: [id number] PCP: No primary care provider on file  Admission Date:   Admission Orders (From admission, onward)     Ordered        20 1357  Inpatient Admission  Once         20 1353  Inpatient Admission  Once                   Date, Time and Cause of Death    Date of Death:  20  Time of Death:   9:06 AM  Preliminary Cause of Death:  Acute respiratory failure with hypoxia ([facility] [address] )           Admitting Diagnosis: Diarrhea [R19 7]  Hyperkalemia [E87 5]  Vomiting [R11 10]  Pneumonia [J18 9]  Renal failure [N19]  SOB (shortness of breath) [R06 02]  Lethargic [R53 83]  Hypoxia [R09 02]    HPI: "Bronson Em is a 78 y o  male with pmh of Dm2, HTN, HLD, and CAD  The last few days the family states that the patient has had a non-productive cough, sob, n/v, and diarrhea  He has refused to seek care prior to today when he developed progressive dyspnea, sob, and AMS  EMS was called and he was brought into the ED, required 15L NRB, spo2 remained 85% and he ultimately required intubation  His labs revealed sCr of 4 81, mild leukocytosis, LA 4 9  Pct pending  CXR shows diffuse b/l lung opacities consistent with covid  Bcx and Swabs performed "    Procedures Performed:   Orders Placed This Encounter   Procedures   [address]    ED ECG Documentation Only    Intubation    Temporary HD Catheter       Summary of Hospital Course: Admitted to ICU after intubation, ultimately requiring CRRT for JESÚS with hyperkalemia, vasopressor support, and deep sedation/paralysis and still remained significantly hypoxic  On the morning of , patient became acutely hypotensive and bradycardic, which progressed into a wide complex  Patient was treated with epi, calcium, bicarb but no chest compressions were performed given poor prognosis and in line with [facility] policy given likely COVID infection  Dr Nicholson present   Patient  at 64  Family was notified      Significant Findings, Care, Treatment and Services Provided:     Complications: none    Disposition:      Final Diagnosis: likely COVID infection, hypoxic respiratory failure    Resolved Problems  Date Reviewed: 2020          Resolved    Lactic acidosis 3/31/2020     Resolved by  Dhruv Lewis PA-C          Condition at Time of Death: critically ill    Date, Time and Cause of Death    Date of Death:  20  Time of Death:   9:06 AM  Preliminary Cause of Death:  Respiratory arrest

## 2020-04-01 NOTE — PROGRESS NOTES
Progress Note Noelle Breeding 1950, 78 y o  male MRN: [id number]    Unit/Bed#: ICU 01 Encounter: [id number]    Primary Care Provider: No primary care provider on file     Date and time admitted to hospital: 3/30/2020 10:37 AM        * ARDS (adult respiratory distress syndrome) (HCC)  Assessment & Plan  · Likely d/t Covid (pending)  · ARDSnet protocol   · Hypoxia improving   · Continue paralytic to help with vent synchrony   · Current vent settings 30/500/90/22  · Monitor strict I/o's, avoid volume overload    Suspected Covid-19 Virus Infection  Assessment & Plan  · 3/30 Covid swab pending  · Continue Plaquenil/Azithromycin and HQ x 5 days  · Baseline Qtc 473, continue to monitor daily  · Heparin infusion secondary to CVVH     Acute Hypoxic respiratory failure (HCC)  Assessment & Plan  · Intubated 3/30 for progressive Hypoxia and increased SOB  · Covid swab pending  · ETT day 3, size 8, secured at 23 @ lips  · Continue mechanical ventilation, current settings AC 26/350/60/20  · ABG daily    JESÚS (acute kidney injury) ([facility] [address] )  Assessment & Plan  · Baseline sCr unknown, p/w sCr 4 81  · CRRT continue - remove 50 ml/hr  · Electrolyte protocol   · Nephrology following  · Universal consent obtained from Granddaughter who will be decision maker in family Alex Salvage)    Hyperkalemia  Assessment & Plan  · CRRT K bags changed from 4K to 2K on 3/31  · Renal following  · Continue to trend labs as per protocol     DM2 (diabetes mellitus, type 2) [facility])  Assessment & Plan  Lab Results   Component Value Date    HGBA1C 7 1 (H) 03/30/2020       Recent Labs     03/31/20  0149 03/31/20  0357 03/31/20  0558 03/31/20  0820   POCGLU 182* 138 182* 129       Blood Sugar Average: Last 72 hrs:  (P) 183 [id number]     · Hgb A1c 7 1  · Continue insulin infusion      Hyperglycemia  Assessment & Plan  · Continue insulin infusion as per protocol    Metabolic acidosis  Assessment & Plan  · Continue CVVH  · Renal following  · ABG improving    UTI (urinary tract infection)  Assessment & Plan  · UA consistent with infection  · Continue Rocephin day 3  · Follow culture          ----------------------------------------------------------------------------------------  HPI/24hr events:   Patient remained on sedation and Levophed infusion to keep map 65 or greater  Patient tolerated CRRT 50 mL removal per hour  CRRT cartridge lasted for 4 hours before having to be changed despite heparin infusion hanging  Cartridge was changed twice  Disposition: Continue Critical Care   Code Status: Level 1 - Full Code  ---------------------------------------------------------------------------------------  SUBJECTIVE  Paralyzed/sedated    Review of Systems   Unable to perform ROS: Intubated     Review of systems was unable to be performed secondary to paralyzed/sedated   ---------------------------------------------------------------------------------------  OBJECTIVE    Vitals   Vitals:    20 0000 20 0030 20 0100 20 0117   BP: (!) 86/61 93/65 (!) 87/62    BP Location:       Pulse: 99 (!) 115 (!) 110 (!) 108   Resp: (!) 30 (!) 30 (!) 30 (!) 30   Temp: 98 6 °F (37 °C) 98 4 °F (36 9 °C) 98 4 °F (36 9 °C) 98 4 °F (36 9 °C)   TempSrc:       SpO2: 100% 100% 100% 100%   Weight:       Height:         Temp (24hrs), Av 5 °F (36 9 °C), Min:96 8 °F (36 °C), Max:100 4 °F (38 °C)  Current: Temperature: 98 4 °F (36 9 °C)  Arterial Line BP: 91/55  Arterial Line MAP (mmHg): 64 mmHg  SpO2: SpO2: 100 %    Physical Exam   Constitutional: He appears well-developed and well-nourished  Bilateral wrist restraints   HENT:   Head: Normocephalic and atraumatic  Right Ear: External ear normal    Left Ear: External ear normal    Nose: Nose normal    Mouth/Throat: Oropharynx is clear and moist    Eyes: Pupils are equal, round, and reactive to light  Conjunctivae and EOM are normal    Neck: Normal range of motion  Neck supple     Cardiovascular: Normal heart sounds and intact distal pulses  Sinus tachycardia  +1 lower extremity edema   Pulmonary/Chest:   Bilateral breath sounds course/rhonchorous  ETT   Abdominal: Soft  Bowel sounds hypoactive  OG tube   Genitourinary:   Genitourinary Comments: Urinary catheter   Musculoskeletal:   Generalized weakness   Neurological:   Paralyzed/sedated   Skin: Skin is warm and dry  Capillary refill takes less than 2 seconds  Psychiatric:   Paralyzed/sedated   Nursing note and vitals reviewed        Invasive/non-invasive ventilation settings   Respiratory    Lab Data (Last 4 hours)    None         O2/Vent Data (Last 4 hours)      03/31 2344           Vent Mode AC/VC       Resp Rate (BPM) (BPM) 30       Vt (mL) (mL) 500       FIO2 (%) (%) 90       PEEP (cmH2O) (cmH2O) 22       MV 16 3                   Laboratory and Diagnostics:  Results from last 7 days   Lab Units 03/31/20  0353 03/30/20  1048   WBC Thousand/uL 16 88* 11 43*   HEMOGLOBIN g/dL 13 0 14 0   HEMATOCRIT % 40 3 43 7   PLATELETS Thousands/uL 154 213   NEUTROS PCT % 83*  --    BANDS PCT %  --  7   MONOS PCT % 5  --    MONO PCT %  --  10     Results from last 7 days   Lab Units 03/31/20  2357 03/31/20  2213 03/31/20  2018 03/31/20  1736 03/31/20  1635 03/31/20  1441 03/31/20  1231 03/31/20  1019 03/31/20  0353 03/30/20  2154 03/30/20  1636 03/30/20  1048   SODIUM mmol/L 137  --   --  136  --   --   --  138 140 142 136 135*   POTASSIUM mmol/L 4 2  --   --  4 9  --   --   --  4 8 4 4 4 1 6 6* 5 9*   CHLORIDE mmol/L 101  --   --  100  --   --   --  103 103 105 105 99*   CO2 mmol/L 27  --   --  24  --   --   --  26 26 25 20* 16*   ANION GAP mmol/L 9  --   --  12  --   --   --  9 11 12 11 20*   BUN mg/dL 25  --   --  31*  --   --   --  39* 42* 54* 65* 68*   CREATININE mg/dL 1 97*  --   --  2 18*  --   --   --  2 58* 2 71* 3 20* 4 08* 4 81*   CALCIUM mg/dL 8 6  --   --  8 7  --   --   --  8 5 8 4 7 7* 7 4* 8 8   GLUCOSE RANDOM mg/dL 132 148* 181* 207* 206* 198* 188* 127 151* 133 271* 270*   ALT U/L  --   --   --   --   --   --   --   --   --   --   --  38   AST U/L  --   --   --   --   --   --   --   --   --   --   --  80*   ALK PHOS U/L  --   --   --   --   --   --   --   --   --   --   --  59   ALBUMIN g/dL  --   --   --   --   --   --   --   --   --   --   --  3 2*   TOTAL BILIRUBIN mg/dL  --   --   --   --   --   --   --   --   --   --   --  0 40     Results from last 7 days   Lab Units 03/31/20  2357 03/31/20  1736 03/31/20  1019 03/31/20  0353 03/30/20  2154 03/30/20  1636   MAGNESIUM mg/dL 1 9 1 7 2 0 1 9 2 4 1 7   PHOSPHORUS mg/dL 3 4 3 6 4 5* 3 8 3 4 3 5      Results from last 7 days   Lab Units 03/31/20  2357 03/31/20  1736 03/31/20  0956 03/31/20  0147 03/30/20  1048   INR   --   --   --   --  1 02   PTT seconds 31 40* 46* 41* 32      Results from last 7 days   Lab Units 03/30/20  1048   TROPONIN I ng/mL <0 02     Results from last 7 days   Lab Units 03/30/20  1636 03/30/20  1048   LACTIC ACID mmol/L 0 7 4 9*     ABG:  Results from last 7 days   Lab Units 03/31/20  1603   PH ART  7 271*   PCO2 ART mm Hg 48 1*   PO2 ART mm Hg 107 9   HCO3 ART mmol/L 21 6*   BASE EXC ART mmol/L -5 3   ABG SOURCE  Line, Arterial     VBG:  Results from last 7 days   Lab Units 03/31/20  1603   ABG SOURCE  Line, Arterial     Results from last 7 days   Lab Units 03/31/20  0353 03/30/20  1636   PROCALCITONIN ng/ml 1 99* 1 55*       Micro  Results from last 7 days   Lab Units 03/30/20  1118 03/30/20  1048   BLOOD CULTURE  No Growth at 24 hrs  No Growth at 24 hrs  EKG: ST  Imaging: I have personally reviewed pertinent reports  Xr Chest Portable    Result Date: 3/30/2020  Impression: Bilateral pulmonary vascular congestion and interstitial thickening are again noted  Endotracheal tube is present, in satisfactory position with its tip above the level of the marlo  Enteric tube is present with its tip extending below the left hemidiaphragm    Right and left IJ approach central venous catheters with tips at the junction of the SVC and right atrium  Throughout the lungs  Workstation performed: [id number]     Xr Chest 1 View Portable    Result Date: 3/30/2020  Impression: 1  The tip of the endotracheal tube projects 4 2 cm above the marlo  2   Interval worsening of diffuse bilateral lung opacities  Workstation performed: [id number]     Xr Chest 1 View Portable    Result Date: 3/30/2020  Impression: No active pulmonary disease on examination which is somewhat limited secondary to low lung volumes  Workstation performed: [id number]     Xr Chest Portable Icu    Result Date: 3/30/2020  Impression: ETT, NGT and left IJ line in place  Persistent bilateral airspace disease  Workstation performed: [id number]   Intake and Output  I/O       03/30 0701 - 03/31 0700 03/31 0701 - 04/01 0700    I V  (mL/kg) 4783 9 (43 1) 2112 (19)    NG/GT  90    IV Piggyback 350     Total Intake(mL/kg) 5133 9 (46 3) 2202 (19 8)    Urine (mL/kg/hr) 930 320 (0 1)    Emesis/NG output 0 525    Other 1757 2067    Total Output 2687 2912    Net +2446 9 -710                Height and Weights   Height: 5' 10" (177 8 cm)  IBW: 73 kg  Body mass index is 35 27 kg/m²  Weight (last 2 days)     Date/Time   Weight    03/31/20 0552   112 (245 81)    03/30/20 1600   109 (240 3)    03/30/20 1220   105 (232 37)    03/30/20 1039   108 (238 98)    03/30/20 1026   97 1 (214)                Nutrition       Diet Orders   (From admission, onward)             Start     Ordered    03/31/20 0856  Room Service  Once     Question:  Type of Service  Answer:  Room Service- Not Appropriate    03/31/20 0856    03/30/20 1453  Diet NPO  Diet effective now     Question Answer Comment   Diet Type NPO    RD to adjust diet per protocol?  Yes        03/30/20 1452                  Active Medications  Scheduled Meds:  Current Facility-Administered Medications:  vecuronium (NORCURON) infusion 0 8-2 5 mcg/kg/min Intravenous Titrated Adilene Ochoa PA-C Last Rate: 1 mcg/kg/min (03/31/20 2037)   And        artificial tear  Both Eyes Q2H Adilene Ochoa PA-C    azithromycin 500 mg Oral Q24H Tamara [place], PA-C    cefTRIAXone 1,000 mg Intravenous Q24H Tamara [place], PA-C Last Rate: Stopped (03/31/20 1413)   chlorhexidine 15 mL Swish & Spit Q12H [address] Tamara Rosalinda, PA-C    fentanyl citrate (PF) 50 mcg Intravenous Q1H PRN Tamara Rosalinda, PA-C    heparin (porcine) 600 Units/hr Intravenous Continuous Tamara Rosalinda, PA-C Last Rate: 600 Units/hr (03/31/20 0345)   hydroxychloroquine 200 mg Oral TID With Meals Tamara [place], PA-C    influenza vaccine 0 5 mL Intramuscular Once Elly Fernandez MD    insulin regular (HumuLIN R,NovoLIN R) infusion 0 3-21 Units/hr Intravenous Titrated Tamara [place], PA-C Last Rate: 4 Units/hr (04/01/20 0051)   magnesium sulfate 1 g Intravenous Once Elly Fernandez MD    midazolam 1 mg Intravenous Q2H PRN Srini GAYE Knowles    midazolam 4 mg Intravenous Once Tamara Rosalinda, PA-C    norepinephrine 1-30 mcg/min Intravenous Titrated Srini Murrayor PA-C Last Rate: 15 mcg/min (03/31/20 2241)   NxStage K 2/Ca 3 20,000 mL Dialysis Continuous Alisia Favre, MD    pantoprazole 40 mg Intravenous Q24H [address] BACILIO Wilder    pneumococcal 13-valent conjugate vaccine 0 5 mL Intramuscular Prior to discharge Elly Fernandez MD    propofol 5-50 mcg/kg/min Intravenous Titrated Jim Diaz MD Last Rate: 50 mcg/kg/min (03/31/20 2242)     Continuous Infusions:    heparin (porcine) 600 Units/hr Last Rate: 600 Units/hr (03/31/20 0345)   insulin regular (HumuLIN R,NovoLIN R) infusion 0 3-21 Units/hr Last Rate: 4 Units/hr (04/01/20 0051)   norepinephrine 1-30 mcg/min Last Rate: 15 mcg/min (03/31/20 2241)   NxStage K 2/Ca 3 20,000 mL    propofol 5-50 mcg/kg/min Last Rate: 50 mcg/kg/min (03/31/20 2242)   vecuronium (NORCURON) infusion 0 8-2 5 mcg/kg/min Last Rate: 1 mcg/kg/min (03/31/20 2037)     PRN Meds:     fentanyl citrate (PF) 50 mcg Q1H PRN   midazolam 1 mg Q2H PRN pneumococcal 13-valent conjugate vaccine 0 5 mL Prior to discharge       Invasive Devices Review  Invasive Devices     Central Venous Catheter Line            CVC Central Lines 03/30/20 Triple 20cm 1 day          Peripheral Intravenous Line            Peripheral IV 03/30/20 Right Antecubital 1 day    Peripheral IV 03/30/20 Right Wrist 1 day          Arterial Line            Arterial Line 03/30/20 Radial 1 day          Hemodialysis Catheter            HD Temporary Double Catheter 1 day          Drain            NG/OG/Enteral Tube Orogastric Right mouth 1 day    Urethral Catheter Non-latex 16 Fr  1 day          Airway            ETT  8 mm 1 day                      BACILIO Ramirez      Portions of the record may have been created with voice recognition software  Occasional wrong word or "sound a like" substitutions may have occurred due to the inherent limitations of voice recognition software    Read the chart carefully and recognize, using context, where substitutions have occurred

## 2020-04-01 NOTE — DEATH NOTE
INPATIENT DEATH NOTE  Deandre Crouch 78 y o  male MRN: [id number]  Unit/Bed#: ICU 01 Encounter: [id number]         Patient's Information  Pronounced by: Dr GARRETT  Did the patient's death occur in the ED?: No  Did the patient's death occur in the OR?: No  Did the patient's death occur less than 10 days post-op?: No  Did the patient's death occur within 24 hours of admission?: No  Was code status DNR at the time of death?: No(Per Dr GARRETT meds to be given only)    PHYSICAL EXAM:  Unresponsive to noxious stimuli  No Carotid Pulse  No spont breaths  Pupils fixed and dilated    Medical Examiner notification criteria:  COVID pending   Medical Examiner's office notified?:  Yes   Medical Examiner accepted case?:  pending      Family Notification  Was the family notified?: Yes(Dr GARRETT called Nishant Colin daughter)  Date Notified: 20  Time Notified: 12  Notified by: Dr GARRETT  Name of Family Notified of Death: Kathia Zhu Person Relationship to Patient: Daughter  Family Notification Route: Telephone    Autopsy Options:  Decision for post-mortem examination not yet made by next of kin      Primary Service Attending Physician notified?:  no    Physician/Resident responsible for completing Discharge Summary:  Dr GARRETT

## 2020-04-01 NOTE — PROGRESS NOTES
NEPHROLOGY PROGRESS NOTE    Ophelia Lynch 78 y o  male MRN: [id number]  Unit/Bed#: ICU 01 Encounter: [id number]  Reason for Consult:  Acute renal failure    The patient was visualized through the glass door in ICU and I did not personally go to the bedside due to risk of COVID-19 exposure in this positive patient  I spoke to the nurse through the glass and she states things have been relatively stable  CVVH did clot twice overnight  Patient is on pressor support  Remains intubated  ASSESSMENT/PLAN:  1  Renal    The patient has acute renal failure with low urine output due to ATN  He is currently on pressor support in ICU intubated  He was initiated on CVVH for hyperkalemia worsening kidney function  At this point electrolytes have improved  Current CVVH orders are 2 potassium/3 calcium replacement fluid at 2000 cc/hour  Blood flow 250 cc/minute  Using Heparin to help avoid clotting  UF -50 cc/hours tolerated  Will continue to monitor labs per protocol and replacement electrolytes ordered as well  2  Pulmonary    Patient with ARDS intubated in ICU  Critical care is managing  High suspicion of COVID-19 infection but confirmatory testing is still pending  Continue with supportive care and critical care management  On treatment with azithromycin and hydroxychloroquine  SUBJECTIVE:  ROS  Patient intubated was visualized through the glass as I did not go in the room due to COVID-19 exposure risk  He was in no distress on ventilator and I communicated with the nurse  OBJECTIVE:  Current Weight: Weight - Scale: 105 kg (230 lb 8 2 oz)  Vitals:Temp (24hrs), Av 1 °F (37 3 °C), Min:97 2 °F (36 2 °C), Max:100 4 °F (38 °C)  Current: Temperature: 99 1 °F (37 3 °C)   Blood pressure 91/59, pulse (!) 127, temperature 99 1 °F (37 3 °C), resp  rate (!) 30, height 5' 10" (1 778 m), weight 105 kg (230 lb 8 2 oz), SpO2 96 %  , Body mass index is 33 08 kg/m²        Intake/Output Summary (Last 24 hours) at 4/1/2020 0902  Last data filed at 4/1/2020 0700  Gross per 24 hour   Intake 2711 ml   Output 3716 ml   Net -1005 ml       Physical Exam: BP 91/59   Pulse (!) 127   Temp 99 1 °F (37 3 °C)   Resp (!) 30   Ht 5' 10" (1 778 m)   Wt 105 kg (230 lb 8 2 oz)   SpO2 96%   BMI 33 08 kg/m²   Physical Exam  I did not personally examined the patient but visually inspected through the glass along with the nurses cooperation due to risk of COVID-19 exposure    Medications:    Current Facility-Administered Medications:     acetaminophen (TYLENOL) oral suspension 650 mg, 650 mg, Oral, Q6H PRN, Adilene Ochoa PA-C    vecuronium (NORCURON) 20 mg in sodium chloride 0 9 % 100 mL infusion, 0 8-2 5 mcg/kg/min, Intravenous, Titrated, Last Rate: 33 6 mL/hr at 04/01/20 0509, 1 mcg/kg/min at 04/01/20 0509 **AND** artificial tear (LUBRIFRESH P M ) ophthalmic ointment, , Both Eyes, Q2H, Adilene Ochoa PA-C    azithromycin (ZITHROMAX) tablet 500 mg, 500 mg, Oral, Q24H, Grier Romberg, PA-C, 500 mg at 03/31/20 1240    cefTRIAXone (ROCEPHIN) IVPB (premix) 1,000 mg, 1,000 mg, Intravenous, Q24H, Grier Romberg, PA-C, Stopped at 03/31/20 1413    chlorhexidine (PERIDEX) 0 12 % oral rinse 15 mL, 15 mL, Swish & Spit, Q12H [address], Grier Romberg, PA-C, 15 mL at 04/01/20 0348    fentanyl citrate (PF) 100 MCG/2ML 50 mcg, 50 mcg, Intravenous, Q1H PRN, Grier Romberg, PA-C    heparin (porcine) 25,000 units in 250 mL infusion (premix), 600 Units/hr, Intravenous, Continuous, Grier Romberg, PA-C, Last Rate: 6 mL/hr at 04/01/20 0500, 600 Units/hr at 04/01/20 0500    HYDROmorphone (DILAUDID) 50 mg in sodium chloride 0 9% 50mL drip, 0 5 mg/hr, Intravenous, Continuous, Adilene Ochoa PA-C    [COMPLETED] hydroxychloroquine (PLAQUENIL) oral suspension 600 mg, 600 mg, Oral, BID With Meals, 600 mg at 03/31/20 9770 **FOLLOWED BY** hydroxychloroquine (PLAQUENIL) oral suspension 200 mg, 200 mg, Oral, TID With Meals, Grier Romberg, PA-C, 200 mg at 04/01/20 4557    influenza vaccine, high-dose (FLUZONE HIGH-DOSE) IM injection MEHUL 0 5 mL, 0 5 mL, Intramuscular, Once, Jackie Granger MD    insulin regular (HumuLIN R,NovoLIN R) 1 Units/mL in sodium chloride 0 9 % 100 mL infusion, 0 3-21 Units/hr, Intravenous, Titrated, GAYE Palmer, Last Rate: 4 mL/hr at 04/01/20 0854, 4 Units/hr at 04/01/20 0854    midazolam (VERSED) injection 1 mg, 1 mg, Intravenous, Q2H PRN, Brennan Mcintosh PA-C    midazolam (VERSED) injection 4 mg, 4 mg, Intravenous, Once, GAYE Palmer    norepinephrine (LEVOPHED) 8 mg (DOUBLE CONCENTRATION) IV in sodium chloride 0 9% 250 mL, 1-30 mcg/min, Intravenous, Titrated, Brennan Mcintosh PA-C, Last Rate: 56 3 mL/hr at 04/01/20 0847, 30 mcg/min at 04/01/20 0847    NxStage K 2/Ca 3 dialysis solution (RFP-400) 20,000 mL, 20,000 mL, Dialysis, Continuous, Bobbetta Mohs, MD, 20,000 mL at 04/01/20 0400    pantoprazole (PROTONIX) injection 40 mg, 40 mg, Intravenous, Q24H [address], [employer], CRNP, 40 mg at 04/01/20 8343    pneumococcal 13-valent conjugate vaccine (PREVNAR-13) IM injection 0 5 mL, 0 5 mL, Intramuscular, Prior to discharge, Jackie Granger MD    propofol (DIPRIVAN) 1000 mg in 100 mL infusion (premix), 5-50 mcg/kg/min, Intravenous, Titrated, Sabrina Boothe MD, Last Rate: 12 6 mL/hr at 04/01/20 0836, 20 mcg/kg/min at 04/01/20 0836    vasopressin (PITRESSIN) 20 Units in sodium chloride 0 9 % 100 mL infusion, 0 04 Units/min, Intravenous, Continuous, Gucci Dominguez PA-C    Laboratory Results:  Lab Results   Component Value Date    WBC 17 24 (H) 04/01/2020    HGB 12 4 04/01/2020    HCT 38 0 04/01/2020    MCV 89 04/01/2020    PLT 63 (L) 04/01/2020     Lab Results   Component Value Date    SODIUM 137 04/01/2020    K 4 0 04/01/2020     04/01/2020    CO2 24 04/01/2020    BUN 20 04/01/2020    CREATININE 1 97 (H) 04/01/2020    GLUC 128 04/01/2020    CALCIUM 7 8 (L) 04/01/2020     Lab Results   Component Value Date    CALCIUM 7 8 (L) 04/01/2020    PHOS 3 3 04/01/2020     No results found for: LABPROT

## 2020-04-01 NOTE — ASSESSMENT & PLAN NOTE
Lab Results   Component Value Date    HGBA1C 7 1 (H) 03/30/2020       Recent Labs     03/31/20  0149 03/31/20  0357 03/31/20  0558 03/31/20  0820   POCGLU 182* 138 182* 129       Blood Sugar Average: Last 72 hrs:  (P) 183 [id number]     · Hgb A1c 7 1  · Continue insulin infusion

## 2020-04-01 NOTE — PROGRESS NOTES
Progress Note - Critical Care   Laurel Delay 78 y o  male MRN: [id number]  Unit/Bed#: ICU 01 Encounter: [id number]     CODE Event RECAP    Assessment: Acute Wide Complex PEA arrest refractory to Intervention  Time of Death 09      Sudden onset of bradycardia with poor perfusion, Wide Complex noted  Treated with Epi, CaCl and Bicarb    NO chest Compression was performed at My direction in light of abysmal prognosis and in line with [facility]    Patient's daughter informed by me on telephone  Condolences offered and recap of events was provided  Physical Exam:       Arterial Line BP: 92/60  Arterial Line MAP (mmHg): 69 mmHg    Temperature:   Temp (24hrs), Av 1 °F (37 3 °C), Min:97 2 °F (36 2 °C), Max:100 4 °F (38 °C)    Current: Temperature: 99 1 °F (37 3 °C)    Weights:   IBW: 73 kg    Body mass index is 33 08 kg/m²  Hemodynamic Monitoring:  N/A           SpO2: SpO2: 96 %    Intake and Outputs:  I/O       701 -  07 07 -  0700  07 -  0700    I V  (mL/kg) 4783 9 (43 1) 2793 (26 6)     NG/GT  90     IV Piggyback 350      Total Intake(mL/kg) 5133 9 (46 3) 2883 (27 5)     Urine (mL/kg/hr) 930 340 (0 1)     Emesis/NG output 0 1075     Other 1757 2381     Total Output 2687 3796     Net +2446 9 -913                      Labs:      Lab Units 20  0545 20  2357 20  1736  20  1048   SODIUM mmol/L 137 137 136   < > 135*   POTASSIUM mmol/L 4 0 4 2 4 9   < > 5 9*   CHLORIDE mmol/L 102 101 100   < > 99*   CO2 mmol/L 24 27 24   < > 16*   BUN mg/dL 20 25 31*   < > 68*   CREATININE mg/dL 1 97* 1 97* 2 18*   < > 4 81*   CALCIUM mg/dL 7 8* 8 6 8 7   < > 8 8   ALK PHOS U/L  --   --   --   --  59   ALT U/L  --   --   --   --  38   AST U/L  --   --   --   --  80*    < > = values in this interval not displayed       Results from last 7 days   Lab Units 20  0545 20  2357 20  1736   MAGNESIUM mg/dL 1 9 1 9 1 7     Results from last 7 days Lab Units 04/01/20  0545 03/31/20  2357 03/31/20  1736   PHOSPHORUS mg/dL 3 3 3 4 3 6      Results from last 7 days   Lab Units 04/01/20  0545 03/31/20  2357 03/31/20  1736  03/30/20  1048   INR   --   --   --   --  1 02   PTT seconds 33 31 40*   < > 32    < > = values in this interval not displayed  Results from last 7 days   Lab Units 03/30/20  1636   LACTIC ACID mmol/L 0 7     0   Lab Value Date/Time    TROPONINI <0 02 03/30/2020 1048       Imaging:  I have personally reviewed pertinent films in PACS    EKG: Sinus Tach to jxn 30s with wide Complex    Micro:  Lab Results   Component Value Date    BLOODCX No Growth at 24 hrs  03/30/2020    BLOODCX No Growth at 24 hrs  03/30/2020       Allergies: No Known Allergies    Medications:   Scheduled Meds:  Continuous Infusions:  heparin (porcine) 600 Units/hr Last Rate: 600 Units/hr (04/01/20 0500)   HYDROmorphone 0 5 mg/hr    insulin regular (HumuLIN R,NovoLIN R) infusion 0 3-21 Units/hr Last Rate: 4 Units/hr (04/01/20 0854)   norepinephrine 1-30 mcg/min Last Rate: 30 mcg/min (04/01/20 0847)   NxStage K 2/Ca 3 20,000 mL    propofol 5-50 mcg/kg/min Last Rate: 20 mcg/kg/min (04/01/20 0836)   vasopressin (PITRESSIN) in 0 9 % sodium chloride 100 mL 0 04 Units/min    vecuronium (NORCURON) infusion 0 8-2 5 mcg/kg/min Last Rate: 1 mcg/kg/min (04/01/20 0509)     PRN Meds:    acetaminophen 650 mg Q6H PRN   fentanyl citrate (PF) 50 mcg Q1H PRN   midazolam 1 mg Q2H PRN   pneumococcal 13-valent conjugate vaccine 0 5 mL Prior to discharge       VTE Pharmacologic Prophylaxis: heparin  VTE Mechanical Prophylaxis: sequential compression device    Invasive lines and devices:   Invasive Devices     Central Venous Catheter Line            CVC Central Lines 03/30/20 Triple 20cm 1 day          Peripheral Intravenous Line            Peripheral IV 03/30/20 Right Antecubital 1 day    Peripheral IV 03/30/20 Right Wrist 1 day          Arterial Line            Arterial Line 03/30/20 Radial 1 day          Hemodialysis Catheter            HD Temporary Double Catheter 1 day          Drain            NG/OG/Enteral Tube Orogastric Right mouth 1 day    Urethral Catheter Non-latex 16 Fr  1 day          Airway            ETT  8 mm 1 day                   Counseling / Coordination of Care  Total time spent today 30 minutes  Greater than 50% of total time was spent with the patient and / or family counseling and / or coordination of care  A description of the counseling / coordination of care: family phone call     Code Status: Level 1 - Full Code     Portions of the record may have been created with voice recognition software  Occasional wrong word or "sound a like" substitutions may have occurred due to the inherent limitations of voice recognition software  Read the chart carefully and recognize, using context, where substitutions have occurred       oMnse Puckett MD

## 2020-04-01 NOTE — ASSESSMENT & PLAN NOTE
· Likely d/t Covid (pending)  · ARDSnet protocol   · Hypoxia improving   · Continue paralytic to help with vent synchrony   · Current vent settings 30/500/90/22  · Monitor strict I/o's, avoid volume overload

## 2020-04-01 NOTE — ASSESSMENT & PLAN NOTE
· CRRT K bags changed from 4K to 2K on 3/31  · Renal following  · Continue to trend labs as per protocol

## 2020-04-01 NOTE — ASSESSMENT & PLAN NOTE
· Intubated 3/30 for progressive Hypoxia and increased SOB  · Covid swab pending  · ETT day 3, size 8, secured at 23 @ lips  · Continue mechanical ventilation, current settings AC 26/350/60/20  · ABG daily

## 2020-04-01 NOTE — PLAN OF CARE
Problem: Potential for Falls  Goal: Patient will remain free of falls  Description  INTERVENTIONS:  - Assess patient frequently for physical needs  -  Identify cognitive and physical deficits and behaviors that affect risk of falls    -  [place] fall precautions as indicated by assessment   - Educate patient/family on patient safety including physical limitations  - Instruct patient to call for assistance with activity based on assessment  - Modify environment to reduce risk of injury  - Consider OT/PT consult to assist with strengthening/mobility  Outcome: Progressing     Problem: RESPIRATORY - ADULT  Goal: Achieves optimal ventilation and oxygenation  Description  INTERVENTIONS:  - Assess for changes in respiratory status  - Assess for changes in mentation and behavior  - Position to facilitate oxygenation and minimize respiratory effort  - Oxygen administered by appropriate delivery if ordered  - Initiate smoking cessation education as indicated  - Encourage broncho-pulmonary hygiene including cough, deep breathe, Incentive Spirometry  - Assess the need for suctioning and aspirate as needed  - Assess and instruct to report SOB or any respiratory difficulty  - Respiratory Therapy support as indicated  - Ventilator   Outcome: Progressing     Problem: GASTROINTESTINAL - ADULT  Goal: Minimal or absence of nausea and/or vomiting  Description  INTERVENTIONS:  - Administer IV fluids if ordered to ensure adequate hydration  - Maintain NPO status until nausea and vomiting are resolved  - Nasogastric tube if ordered  - Administer ordered antiemetic medications as needed  - Provide nonpharmacologic comfort measures as appropriate  - Advance diet as tolerated, if ordered  - Consider nutrition services referral to assist patient with adequate nutrition and appropriate food choices  Outcome: Progressing  Goal: Maintains or returns to baseline bowel function  Description  INTERVENTIONS:  - Assess bowel function  - Encourage oral fluids to ensure adequate hydration  - Administer IV fluids if ordered to ensure adequate hydration  - Administer ordered medications as needed  - Encourage mobilization and activity  - Consider nutritional services referral to assist patient with adequate nutrition and appropriate food choices  Outcome: Progressing  Goal: Maintains adequate nutritional intake  Description  INTERVENTIONS:  - Monitor percentage of each meal consumed  - Identify factors contributing to decreased intake, treat as appropriate  - Assist with meals as needed  - Monitor I&O, weight, and lab values if indicated  - Obtain nutrition services referral as needed  Outcome: Progressing  Goal: Establish and maintain optimal ostomy function  Description  INTERVENTIONS:  - Assess bowel function  - Encourage oral fluids to ensure adequate hydration  - Administer IV fluids if ordered to ensure adequate hydration   - Administer ordered medications as needed  - Encourage mobilization and activity  - Nutrition services referral to assist patient with appropriate food choices  - Assess stoma site  - Consider wound care consult   Outcome: Progressing     Problem: METABOLIC, FLUID AND ELECTROLYTES - ADULT  Goal: Electrolytes maintained within normal limits  Description  INTERVENTIONS:  - Monitor labs and assess patient for signs and symptoms of electrolyte imbalances  - Administer electrolyte replacement as ordered  - Monitor response to electrolyte replacements, including repeat lab results as appropriate  - Instruct patient on fluid and nutrition as appropriate  Outcome: Progressing  Goal: Fluid balance maintained  Description  INTERVENTIONS:  - Monitor labs   - Monitor I/O and WT  - Instruct patient on fluid and nutrition as appropriate  - Assess for signs & symptoms of volume excess or deficit  Outcome: Progressing  Goal: Glucose maintained within target range  Description  INTERVENTIONS:  - Monitor Blood Glucose as ordered  - Assess for signs and symptoms of hyperglycemia and hypoglycemia  - Administer ordered medications to maintain glucose within target range  - Assess nutritional intake and initiate nutrition service referral as needed  Outcome: Progressing     Problem: Prexisting or High Potential for Compromised Skin Integrity  Goal: Skin integrity is maintained or improved  Description  INTERVENTIONS:  - Identify patients at risk for skin breakdown  - Assess and monitor skin integrity  - Assess and monitor nutrition and hydration status  - Monitor labs   - Assess for incontinence   - Turn and reposition patient  - Assist with mobility/ambulation  - Relieve pressure over bony prominences  - Avoid friction and shearing  - Provide appropriate hygiene as needed including keeping skin clean and dry  - Evaluate need for skin moisturizer/barrier cream  - Collaborate with interdisciplinary team   - Patient/family teaching  - Consider wound care consult   Outcome: Progressing     Problem: Nutrition/Hydration-ADULT  Goal: Nutrient/Hydration intake appropriate for improving, restoring or maintaining nutritional needs  Description  Monitor and assess patient's nutrition/hydration status for malnutrition  Collaborate with interdisciplinary team and initiate plan and interventions as ordered  Monitor patient's weight and dietary intake as ordered or per policy  Utilize nutrition screening tool and intervene as necessary  Determine patient's food preferences and provide high-protein, high-caloric foods as appropriate       INTERVENTIONS:  - Monitor oral intake, urinary output, labs, and treatment plans  - Assess nutrition and hydration status and recommend course of action  - Evaluate amount of meals eaten  - Assist patient with eating if necessary   - Allow adequate time for meals  - Recommend/ encourage appropriate diets, oral nutritional supplements, and vitamin/mineral supplements  - Order, calculate, and assess calorie counts as needed  - Recommend, monitor, and adjust tube feedings and TPN/PPN based on assessed needs  - Assess need for intravenous fluids  - Provide specific nutrition/hydration education as appropriate  - Include patient/family/caregiver in decisions related to nutrition  Outcome: Progressing     Problem: SAFETY,RESTRAINT: NV/NON-SELF DESTRUCTIVE BEHAVIOR  Goal: Remains free of harm/injury (restraint for non violent/non self-detsructive behavior)  Description  INTERVENTIONS:  - Instruct patient/family regarding restraint use   - Assess and monitor physiologic and psychological status   - Provide interventions and comfort measures to meet assessed patient needs   - Identify and implement measures to help patient regain control  - Assess readiness for release of restraint   Outcome: Progressing  Goal: Returns to optimal restraint-free functioning  Description  INTERVENTIONS:  - Assess the patient's behavior and symptoms that indicate continued need for restraint  - Identify and implement measures to help patient regain control  - Assess readiness for release of restraint   Outcome: Progressing

## 2020-04-01 NOTE — ASSESSMENT & PLAN NOTE
· Baseline sCr unknown, p/w sCr 4 81  · CRRT continue - remove 50 ml/hr  · Electrolyte protocol   · Nephrology following  · Universal consent obtained from Granddaughter who will be decision maker in family [place])

## 2020-04-02 ENCOUNTER — DOCUMENTATION (OUTPATIENT)
Dept: ICU | Facility: HOSPITAL | Age: 70
End: 2020-04-02

## 2020-04-02 LAB — SARS-COV-2 RNA SPEC QL NAA+PROBE: DETECTED

## 2020-04-03 NOTE — QUICK NOTE
Patient's son Solo Nelson called nursing supervisor requesting Covid 19 results  Son was called back at [phone] and information was relayed that Covid 19 test was positive and resulted earlier today  It was recommended that himself and anyone else that was in the home self quarantine for approximately 14 days and monitor themselves for any new signs and symptoms  Son understood and appreciated the phone call  Phone call was made on 4/2/2020 at [phone]

## 2020-04-04 LAB
BACTERIA BLD CULT: NORMAL
BACTERIA BLD CULT: NORMAL